# Patient Record
Sex: FEMALE | Race: WHITE | NOT HISPANIC OR LATINO | Employment: OTHER
[De-identification: names, ages, dates, MRNs, and addresses within clinical notes are randomized per-mention and may not be internally consistent; named-entity substitution may affect disease eponyms.]

---

## 2018-04-13 LAB
ABO + RH BLD: NORMAL
ABO + RH BLD: NORMAL
BLD GP AB SCN SERPL QL: NORMAL
HBV SURFACE AG SERPL QL IA: NORMAL
HIV 1+2 AB+HIV1 P24 AG SERPL QL IA: NORMAL
RUBELLA ANTIBODY IGG QUANTITATIVE: NORMAL IU/ML
TREPONEMA ANTIBODIES: NORMAL

## 2018-11-08 LAB — GROUP B STREP PCR: NORMAL

## 2018-11-25 ENCOUNTER — HEALTH MAINTENANCE LETTER (OUTPATIENT)
Age: 33
End: 2018-11-25

## 2018-12-05 ENCOUNTER — HOSPITAL ENCOUNTER (INPATIENT)
Facility: CLINIC | Age: 33
LOS: 4 days | Discharge: HOME HEALTH | End: 2018-12-09
Attending: OBSTETRICS & GYNECOLOGY | Admitting: OBSTETRICS & GYNECOLOGY
Payer: COMMERCIAL

## 2018-12-05 ENCOUNTER — HOSPITAL ENCOUNTER (OUTPATIENT)
Facility: CLINIC | Age: 33
Discharge: HOME | End: 2018-12-05
Attending: OBSTETRICS & GYNECOLOGY | Admitting: OBSTETRICS & GYNECOLOGY
Payer: COMMERCIAL

## 2018-12-05 VITALS
DIASTOLIC BLOOD PRESSURE: 84 MMHG | BODY MASS INDEX: 35.26 KG/M2 | SYSTOLIC BLOOD PRESSURE: 127 MMHG | TEMPERATURE: 99.1 F | HEIGHT: 63 IN | WEIGHT: 199 LBS

## 2018-12-05 LAB
ALT SERPL W P-5'-P-CCNC: 29 U/L (ref 0–50)
ANION GAP SERPL CALCULATED.3IONS-SCNC: 8 MMOL/L (ref 3–14)
AST SERPL W P-5'-P-CCNC: 26 U/L (ref 0–45)
BUN SERPL-MCNC: 14 MG/DL (ref 7–30)
CALCIUM SERPL-MCNC: 8.7 MG/DL (ref 8.5–10.1)
CHLORIDE SERPL-SCNC: 108 MMOL/L (ref 94–109)
CO2 SERPL-SCNC: 21 MMOL/L (ref 20–32)
CREAT SERPL-MCNC: 0.73 MG/DL (ref 0.52–1.04)
CREAT UR-MCNC: 26 MG/DL
ERYTHROCYTE [DISTWIDTH] IN BLOOD BY AUTOMATED COUNT: 12.6 % (ref 10–15)
GFR SERPL CREATININE-BSD FRML MDRD: >90 ML/MIN/1.7M2
GLUCOSE SERPL-MCNC: 71 MG/DL (ref 70–99)
HCT VFR BLD AUTO: 39.9 % (ref 35–47)
HGB BLD-MCNC: 14.5 G/DL (ref 11.7–15.7)
MCH RBC QN AUTO: 34.2 PG (ref 26.5–33)
MCHC RBC AUTO-ENTMCNC: 36.3 G/DL (ref 31.5–36.5)
MCV RBC AUTO: 94 FL (ref 78–100)
PLATELET # BLD AUTO: 197 10E9/L (ref 150–450)
POTASSIUM SERPL-SCNC: 4 MMOL/L (ref 3.4–5.3)
PROT UR-MCNC: <0.05 G/L
PROT/CREAT 24H UR: NORMAL G/G CR (ref 0–0.2)
RBC # BLD AUTO: 4.24 10E12/L (ref 3.8–5.2)
SODIUM SERPL-SCNC: 137 MMOL/L (ref 133–144)
URATE SERPL-MCNC: 5.2 MG/DL (ref 2.6–6)
WBC # BLD AUTO: 7.8 10E9/L (ref 4–11)

## 2018-12-05 PROCEDURE — 3E0P7VZ INTRODUCTION OF HORMONE INTO FEMALE REPRODUCTIVE, VIA NATURAL OR ARTIFICIAL OPENING: ICD-10-PCS | Performed by: OBSTETRICS & GYNECOLOGY

## 2018-12-05 PROCEDURE — 84550 ASSAY OF BLOOD/URIC ACID: CPT | Performed by: OBSTETRICS & GYNECOLOGY

## 2018-12-05 PROCEDURE — 84450 TRANSFERASE (AST) (SGOT): CPT | Performed by: OBSTETRICS & GYNECOLOGY

## 2018-12-05 PROCEDURE — 85027 COMPLETE CBC AUTOMATED: CPT | Performed by: OBSTETRICS & GYNECOLOGY

## 2018-12-05 PROCEDURE — 80048 BASIC METABOLIC PNL TOTAL CA: CPT | Performed by: OBSTETRICS & GYNECOLOGY

## 2018-12-05 PROCEDURE — 12000029 ZZH R&B OB INTERMEDIATE

## 2018-12-05 PROCEDURE — 59025 FETAL NON-STRESS TEST: CPT

## 2018-12-05 PROCEDURE — 25000132 ZZH RX MED GY IP 250 OP 250 PS 637: Performed by: OBSTETRICS & GYNECOLOGY

## 2018-12-05 PROCEDURE — G0463 HOSPITAL OUTPT CLINIC VISIT: HCPCS | Mod: 25

## 2018-12-05 PROCEDURE — 84460 ALANINE AMINO (ALT) (SGPT): CPT | Performed by: OBSTETRICS & GYNECOLOGY

## 2018-12-05 PROCEDURE — 84156 ASSAY OF PROTEIN URINE: CPT | Performed by: OBSTETRICS & GYNECOLOGY

## 2018-12-05 PROCEDURE — 36415 COLL VENOUS BLD VENIPUNCTURE: CPT | Performed by: OBSTETRICS & GYNECOLOGY

## 2018-12-05 RX ORDER — ONDANSETRON 2 MG/ML
4 INJECTION INTRAMUSCULAR; INTRAVENOUS EVERY 6 HOURS PRN
Status: DISCONTINUED | OUTPATIENT
Start: 2018-12-05 | End: 2018-12-07

## 2018-12-05 RX ORDER — OXYTOCIN/0.9 % SODIUM CHLORIDE 30/500 ML
100-340 PLASTIC BAG, INJECTION (ML) INTRAVENOUS CONTINUOUS PRN
Status: DISCONTINUED | OUTPATIENT
Start: 2018-12-05 | End: 2018-12-07

## 2018-12-05 RX ORDER — SODIUM CHLORIDE, SODIUM LACTATE, POTASSIUM CHLORIDE, CALCIUM CHLORIDE 600; 310; 30; 20 MG/100ML; MG/100ML; MG/100ML; MG/100ML
INJECTION, SOLUTION INTRAVENOUS CONTINUOUS
Status: DISCONTINUED | OUTPATIENT
Start: 2018-12-06 | End: 2018-12-07

## 2018-12-05 RX ORDER — METHYLERGONOVINE MALEATE 0.2 MG/ML
200 INJECTION INTRAVENOUS
Status: DISCONTINUED | OUTPATIENT
Start: 2018-12-05 | End: 2018-12-07

## 2018-12-05 RX ORDER — OXYTOCIN 10 [USP'U]/ML
10 INJECTION, SOLUTION INTRAMUSCULAR; INTRAVENOUS
Status: DISCONTINUED | OUTPATIENT
Start: 2018-12-05 | End: 2018-12-07

## 2018-12-05 RX ORDER — OXYCODONE AND ACETAMINOPHEN 5; 325 MG/1; MG/1
1 TABLET ORAL
Status: DISCONTINUED | OUTPATIENT
Start: 2018-12-05 | End: 2018-12-07

## 2018-12-05 RX ORDER — ONDANSETRON 2 MG/ML
4 INJECTION INTRAMUSCULAR; INTRAVENOUS EVERY 6 HOURS PRN
Status: DISCONTINUED | OUTPATIENT
Start: 2018-12-05 | End: 2018-12-05 | Stop reason: HOSPADM

## 2018-12-05 RX ORDER — ACETAMINOPHEN 325 MG/1
650 TABLET ORAL EVERY 4 HOURS PRN
Status: DISCONTINUED | OUTPATIENT
Start: 2018-12-05 | End: 2018-12-07

## 2018-12-05 RX ORDER — LIDOCAINE 40 MG/G
CREAM TOPICAL
Status: DISCONTINUED | OUTPATIENT
Start: 2018-12-05 | End: 2018-12-07

## 2018-12-05 RX ORDER — FENTANYL CITRATE 50 UG/ML
50-100 INJECTION, SOLUTION INTRAMUSCULAR; INTRAVENOUS
Status: DISCONTINUED | OUTPATIENT
Start: 2018-12-05 | End: 2018-12-07

## 2018-12-05 RX ORDER — HYDROXYZINE HYDROCHLORIDE 50 MG/1
50 TABLET, FILM COATED ORAL EVERY 6 HOURS PRN
Status: COMPLETED | OUTPATIENT
Start: 2018-12-05 | End: 2018-12-05

## 2018-12-05 RX ORDER — HYDROXYZINE HYDROCHLORIDE 50 MG/1
50-100 TABLET, FILM COATED ORAL
Status: COMPLETED | OUTPATIENT
Start: 2018-12-05 | End: 2018-12-05

## 2018-12-05 RX ORDER — CARBOPROST TROMETHAMINE 250 UG/ML
250 INJECTION, SOLUTION INTRAMUSCULAR
Status: DISCONTINUED | OUTPATIENT
Start: 2018-12-05 | End: 2018-12-07

## 2018-12-05 RX ORDER — NALOXONE HYDROCHLORIDE 0.4 MG/ML
.1-.4 INJECTION, SOLUTION INTRAMUSCULAR; INTRAVENOUS; SUBCUTANEOUS
Status: DISCONTINUED | OUTPATIENT
Start: 2018-12-05 | End: 2018-12-07

## 2018-12-05 RX ORDER — IBUPROFEN 400 MG/1
800 TABLET, FILM COATED ORAL
Status: DISCONTINUED | OUTPATIENT
Start: 2018-12-05 | End: 2018-12-07

## 2018-12-05 RX ADMIN — DINOPROSTONE 10 MG: 10 INSERT VAGINAL at 20:40

## 2018-12-05 RX ADMIN — HYDROXYZINE HYDROCHLORIDE 50 MG: 50 TABLET ORAL at 21:16

## 2018-12-05 NOTE — IP AVS SNAPSHOT
[address], [address]    [facility] [address]    Phone:  [phone]                                       After Visit Summary   12/5/2018    Anabel Araiza    MRN: [id number]           After Visit Summary Signature Page     I have received my discharge instructions, and my questions have been answered. I have discussed any challenges I see with this plan with the nurse or doctor.    ..........................................................................................................................................  Patient/Patient Representative Signature      ..........................................................................................................................................  Patient Representative Print Name and Relationship to Patient    ..................................................               ................................................  Date                                   Time    ..........................................................................................................................................  Reviewed by Signature/Title    ...................................................              ..............................................  Date                                               Time          22EPIC Rev 08/18

## 2018-12-05 NOTE — IP AVS SNAPSHOT
MRN:[id number]                      After Visit Summary   12/5/2018    Anabel Araiza    MRN: [id number]           Thank you!     Thank you for choosing [facility] for your care. Our goal is always to provide you with excellent care. Hearing back from our patients is one way we can continue to improve our services. Please take a few minutes to complete the written survey that you may receive in the mail after you visit with us. Thank you!        Patient Information     Date Of Birth          1985        About your hospital stay     You were admitted on:  December 5, 2018 You last received care in the:  [facility]    You were discharged on:  December 5, 2018       Who to Call     For medical emergencies, please call 911.  For non-urgent questions about your medical care, please call your primary care provider or clinic, [phone]          Attending Provider     Provider Specialty    Emy Ramirez MD OB/Gyn       Primary Care Provider Office Phone # Fax #    Emy Ramirez [phone]      Further instructions from your care team       Discharge Instruction for Undelivered Patients      You were seen for: hypertensive disorders in pregnancy  We Consulted: Dr. Ramirez  You had (Test or Medicine):non stress test, lab draws, serial blood pressures, urine testing     Diet:   Drink 8 to 12 glasses of liquids (milk, juice, water) every day.  You may eat meals and snacks.     Activity:  Call your doctor or nurse midwife if your baby is moving less than usual.     Call your provider if you notice:  Swelling in your face or increased swelling in your hands or legs.  Headaches that are not relieved by Tylenol (acetaminophen).  Changes in your vision (blurring: seeing spots or stars.)  Nausea (sick to your stomach) and vomiting (throwing up).   Weight gain of 5 pounds or more per week.  Heartburn that doesn't go away.  Signs of bladder infection: pain when you urinate (use the  "toilet), need to go more often and more urgently.  The bag of gutierrez (rupture of membranes) breaks, or you notice leaking in your underwear.  Bright red blood in your underwear.  Abdominal (lower belly) or stomach pain.  For first baby: Contractions (tightening) less than 5 minutes apart for one hour or more.  Increase or change in vaginal discharge (note the color and amount)      Follow-up:  This evening at 7:30pm for induction by cervical ripening          Pending Results     No orders found from 12/3/2018 to 2018.            Admission Information     Date & Time Provider Department Dept. Phone    2018 Emy Ramirez MD [facility] [phone]      Your Vitals Were     Blood Pressure Temperature Height Weight BMI (Body Mass Index)       127/84 99.1  F (37.3  C) 1.6 m (5' 3\") 90.3 kg (199 lb) 35.25 kg/m2       MyChart Information     PO-MO lets you send messages to your doctor, view your test results, renew your prescriptions, schedule appointments and more. To sign up, go to [email or web address] . Click on \"Log in\" on the left side of the screen, which will take you to the Welcome page. Then click on \"Sign up Now\" on the right side of the page.     You will be asked to enter the access code listed below, as well as some personal information. Please follow the directions to create your username and password.     Your access code is: [email or web address]  Expires: 3/5/2019  2:33 PM     Your access code will  in 90 days. If you need help or a new code, please call your [place] clinic or [phone].        Care EveryWhere ID     This is your Care EveryWhere ID. This could be used by other organizations to access your [place] medical records  [id number]        Equal Access to Services     [facility] : Sherlyn Berry, thiago mitchell, letty kachris walsh, anne sandoval. So [facility] [phone].    ATENCIÓN: Si franklin baxter " disposición servicios gratuitos de asistencia lingüística. Mary argueta [phone].    We comply with applicable federal civil rights laws and [place] laws. We do not discriminate on the basis of race, color, national origin, age, disability, sex, sexual orientation, or gender identity.               Review of your medicines      UNREVIEWED medicines. Ask your doctor about these medicines        Dose / Directions    FISH OIL + D3 PO        Refills:  0       MAGNESIUM OXIDE PO        Dose:  1000 mg   Take 1,000 mg by mouth   Refills:  0       PRENATAL 1 PO        Refills:  0       VITAMIN D (CHOLECALCIFEROL) PO        Dose:  500 Units   Take 500 Units by mouth daily   Refills:  0         CONTINUE these medicines which have NOT CHANGED        Dose / Directions    NO ACTIVE MEDICATIONS        Refills:  0                Protect others around you: Learn how to safely use, store and throw away your medicines at www.disposemymeds.org.             Medication List: This is a list of all your medications and when to take them. Check marks below indicate your daily home schedule. Keep this list as a reference.      Medications           Morning Afternoon Evening Bedtime As Needed    FISH OIL + D3 PO                                MAGNESIUM OXIDE PO   Take 1,000 mg by mouth                                NO ACTIVE MEDICATIONS                                PRENATAL 1 PO                                VITAMIN D (CHOLECALCIFEROL) PO   Take 500 Units by mouth daily

## 2018-12-05 NOTE — DISCHARGE INSTRUCTIONS
Discharge Instruction for Undelivered Patients      You were seen for: hypertensive disorders in pregnancy  We Consulted: Dr. Ramirez  You had (Test or Medicine):non stress test, lab draws, serial blood pressures, urine testing     Diet:   Drink 8 to 12 glasses of liquids (milk, juice, water) every day.  You may eat meals and snacks.     Activity:  Call your doctor or nurse midwife if your baby is moving less than usual.     Call your provider if you notice:  Swelling in your face or increased swelling in your hands or legs.  Headaches that are not relieved by Tylenol (acetaminophen).  Changes in your vision (blurring: seeing spots or stars.)  Nausea (sick to your stomach) and vomiting (throwing up).   Weight gain of 5 pounds or more per week.  Heartburn that doesn't go away.  Signs of bladder infection: pain when you urinate (use the toilet), need to go more often and more urgently.  The bag of gutierrez (rupture of membranes) breaks, or you notice leaking in your underwear.  Bright red blood in your underwear.  Abdominal (lower belly) or stomach pain.  For first baby: Contractions (tightening) less than 5 minutes apart for one hour or more.  Increase or change in vaginal discharge (note the color and amount)      Follow-up:  This evening at 7:30pm for induction by cervical ripening

## 2018-12-05 NOTE — PROVIDER NOTIFICATION
12/05/18 1418   Comments   Comments orders to d/c home and return this evening for induction

## 2018-12-05 NOTE — PLAN OF CARE
Data: [facility] at 1247.   Reason for maternal/fetal assessment per patient is Rule Out Pre-eclampsia  . Patient is a . Prenatal record reviewed.      Obstetric History       T0      L0     SAB0   TAB0   Ectopic0   Multiple0   Live Births0       # Outcome Date GA Lbr Fernando/2nd Weight Sex Delivery Anes PTL Lv   1 Current                  Medical History:   Past Medical History:   Diagnosis Date     Thyroid disease     hypo   . Gestational Age 40w2d. VSS. Cervix: not examined.  Fetal movement present. Patient denies cramping, backache, vaginal discharge, pelvic pressure, UTI symptoms, GI problems, bloody show, vaginal bleeding, edema, headache, visual disturbances, epigastric or URQ pain, abdominal pain, rupture of membranes. Support persons  present.  Pt was in clinic today with Dr. Ramirez and had first high BP of 150/100.  Needs further assessment in MAC.  Action: Verbal consent for EFM. Triage assessment completed. EFM applied for fetal well being with hypertension. Uterine assessment infrequent mild contractions pt denies feeling. Fetal assessment: Presumed adequate fetal oxygenation documented (see flow record). Preeclampsia labs and urine collected and sent to lab as ordered.  Serial BP's started.  Dr. Ramirez informed of all lab results and recent bp's.  Patient education pamphlets given on triage discharge instructions. Patient instructed to report change in fetal movement, vaginal leaking of fluid or bleeding, abdominal pain, or any concerns related to the pregnancy to her nurse/physician.   Response: Dr. Ramirez is discharging pt home to collect belongings with instructions to return this evening for plan of inducing with cervadil.  Patient verbalized understanding of education and verbalized agreement with plan. Discharged ambulatory at 1430.

## 2018-12-06 LAB
ABO + RH BLD: NORMAL
ABO + RH BLD: NORMAL
ALT SERPL W P-5'-P-CCNC: 25 U/L (ref 0–50)
AST SERPL W P-5'-P-CCNC: 21 U/L (ref 0–45)
CREAT SERPL-MCNC: 0.75 MG/DL (ref 0.52–1.04)
ERYTHROCYTE [DISTWIDTH] IN BLOOD BY AUTOMATED COUNT: 12.8 % (ref 10–15)
GFR SERPL CREATININE-BSD FRML MDRD: 89 ML/MIN/1.7M2
HCT VFR BLD AUTO: 39 % (ref 35–47)
HGB BLD-MCNC: 14 G/DL (ref 11.7–15.7)
MCH RBC QN AUTO: 34.1 PG (ref 26.5–33)
MCHC RBC AUTO-ENTMCNC: 35.9 G/DL (ref 31.5–36.5)
MCV RBC AUTO: 95 FL (ref 78–100)
PLATELET # BLD AUTO: 180 10E9/L (ref 150–450)
RBC # BLD AUTO: 4.1 10E12/L (ref 3.8–5.2)
SPECIMEN EXP DATE BLD: NORMAL
WBC # BLD AUTO: 7.5 10E9/L (ref 4–11)

## 2018-12-06 PROCEDURE — 86900 BLOOD TYPING SEROLOGIC ABO: CPT | Performed by: OBSTETRICS & GYNECOLOGY

## 2018-12-06 PROCEDURE — 84460 ALANINE AMINO (ALT) (SGPT): CPT | Performed by: OBSTETRICS & GYNECOLOGY

## 2018-12-06 PROCEDURE — 36415 COLL VENOUS BLD VENIPUNCTURE: CPT | Performed by: OBSTETRICS & GYNECOLOGY

## 2018-12-06 PROCEDURE — 12000031 ZZH R&B OB CRITICAL

## 2018-12-06 PROCEDURE — 25000125 ZZHC RX 250: Performed by: OBSTETRICS & GYNECOLOGY

## 2018-12-06 PROCEDURE — 86901 BLOOD TYPING SEROLOGIC RH(D): CPT | Performed by: OBSTETRICS & GYNECOLOGY

## 2018-12-06 PROCEDURE — 85027 COMPLETE CBC AUTOMATED: CPT | Performed by: OBSTETRICS & GYNECOLOGY

## 2018-12-06 PROCEDURE — 84450 TRANSFERASE (AST) (SGOT): CPT | Performed by: OBSTETRICS & GYNECOLOGY

## 2018-12-06 PROCEDURE — 10907ZC DRAINAGE OF AMNIOTIC FLUID, THERAPEUTIC FROM PRODUCTS OF CONCEPTION, VIA NATURAL OR ARTIFICIAL OPENING: ICD-10-PCS | Performed by: OBSTETRICS & GYNECOLOGY

## 2018-12-06 PROCEDURE — 25000132 ZZH RX MED GY IP 250 OP 250 PS 637: Performed by: OBSTETRICS & GYNECOLOGY

## 2018-12-06 PROCEDURE — 82565 ASSAY OF CREATININE: CPT | Performed by: OBSTETRICS & GYNECOLOGY

## 2018-12-06 PROCEDURE — 25000128 H RX IP 250 OP 636: Performed by: OBSTETRICS & GYNECOLOGY

## 2018-12-06 PROCEDURE — 86780 TREPONEMA PALLIDUM: CPT | Performed by: OBSTETRICS & GYNECOLOGY

## 2018-12-06 RX ORDER — LIDOCAINE 40 MG/G
CREAM TOPICAL
Status: DISCONTINUED | OUTPATIENT
Start: 2018-12-06 | End: 2018-12-07

## 2018-12-06 RX ORDER — OXYTOCIN/0.9 % SODIUM CHLORIDE 30/500 ML
1-24 PLASTIC BAG, INJECTION (ML) INTRAVENOUS CONTINUOUS
Status: DISCONTINUED | OUTPATIENT
Start: 2018-12-06 | End: 2018-12-07

## 2018-12-06 RX ORDER — MISOPROSTOL 100 UG/1
50 TABLET ORAL
Status: DISCONTINUED | OUTPATIENT
Start: 2018-12-06 | End: 2018-12-07

## 2018-12-06 RX ADMIN — Medication 50 MCG: at 09:53

## 2018-12-06 RX ADMIN — SODIUM CHLORIDE, POTASSIUM CHLORIDE, SODIUM LACTATE AND CALCIUM CHLORIDE: 600; 310; 30; 20 INJECTION, SOLUTION INTRAVENOUS at 21:04

## 2018-12-06 RX ADMIN — OXYTOCIN-SODIUM CHLORIDE 0.9% IV SOLN 30 UNIT/500ML 2 MILLI-UNITS/MIN: 30-0.9/5 SOLUTION at 21:05

## 2018-12-06 RX ADMIN — Medication 50 MCG: at 14:03

## 2018-12-06 RX ADMIN — SODIUM CHLORIDE, POTASSIUM CHLORIDE, SODIUM LACTATE AND CALCIUM CHLORIDE 500 ML: 600; 310; 30; 20 INJECTION, SOLUTION INTRAVENOUS at 23:03

## 2018-12-06 NOTE — H&P
No significant change in general health status based on examination of the patient, review of Nursing Admission Database and prenatal record.    G1 presented yesterday at 40.3 weeks for OB visits and found to have BP of 150/100 and mild HA.  Serial BP's improved at triage and preeclampsia labs normal.  Due to postdates and elevated BP, decision made to proceed with MIL.  Received cervidil last night and is currently on oral cytotec (1st 50 mcg dose at 0900).  FHT category 1.  Ctx every 1-3 min mild.  Cx was FT dilated at 0830 today, will recheck around 1700 today when 3rd dose of cytotec would be due.    Emy Ramirez

## 2018-12-06 NOTE — PROGRESS NOTES
"0715: This RN assumes care of patient from Sandra ARELLANO RN. Bedside report received, patient is comfortable and states she has no needs at this time.     0800: This RN in room. Patient and  questioning, \"what's next\" if the cervidil doesn't work. Education/information provided on possible other ways to ripen cervix and possible plans MD may do. Cervidil to come out at 0830 and this RN will update MD and come up with plans to discuss with patient and her . Patient and  acknowledge understanding and have no further questions. Patient to order breakfast.     0835: This RN in room to remove cervidil. See flowsheets for SVE.    0845: Dr. Emy Ramirez calls back for update. Plan is to give 50mcg of oral cytotec every 4 hours per protocol/orders. Dr. Ramirez to come visit with patient around the noon hour. Information given to patient and her  after the phone call. They are okay with the plan.     0930: Patient back to bed after a walk and shower. Monitors replaced on abdomen at 0936, Oral cytotec given. No questions.     0953: First dose of Cytotec given. Patient doing well. Has no needs at this time.     1200: Doing well, no questions.    1305: This RN in room. Patient standing stating cramping a bit more. Overall feeling well and not requesting needs at this time. Next dose of Cytotec due before 1400. Pt's  going to grab lunch.     1403: 2nd dose of Cytotec given. Patient comfortable, still feeling mild cramping. Eating lunch. No questions at this time.     1500: Patient and  sleeping    3474-8779: FHT appear to be consistent with late decelerations. Dr. Ramirez to come to unit soon and will update then. Return to baseline with no further concerns.     1600: Patient sleeping,  states no needs at this time.     1630: Dr. Ramirez on unit. Discussed strip to MD RE: possible fetal HR decels earlier. OK to proceed. TO patient and  about plan of care. SVE shows change and Dr. Ramirez " performs AROM at approximately 1640 with patient's verbal consent. OK for intermittent auscultation if no ripening agents or pitocin are being used. Plan to start pitocin after 1800 if contractions space out or no cervical change. Patient and  excited for new plan and state no further questions.     1800: Patient doing well, no questions.     1915: Nigel MARQUEZ RN given bedside report.

## 2018-12-07 ENCOUNTER — ANESTHESIA EVENT (OUTPATIENT)
Dept: OBGYN | Facility: CLINIC | Age: 33
End: 2018-12-07
Payer: COMMERCIAL

## 2018-12-07 ENCOUNTER — ANESTHESIA (OUTPATIENT)
Dept: OBGYN | Facility: CLINIC | Age: 33
End: 2018-12-07
Payer: COMMERCIAL

## 2018-12-07 LAB
ALT SERPL W P-5'-P-CCNC: 28 U/L (ref 0–50)
AST SERPL W P-5'-P-CCNC: 28 U/L (ref 0–45)
BLOOD BANK CMNT PATIENT-IMP: NORMAL
BLOOD BANK CMNT PATIENT-IMP: NORMAL
CREAT SERPL-MCNC: 0.7 MG/DL (ref 0.52–1.04)
ERYTHROCYTE [DISTWIDTH] IN BLOOD BY AUTOMATED COUNT: 12.7 % (ref 10–15)
GFR SERPL CREATININE-BSD FRML MDRD: >90 ML/MIN/1.7M2
HCT VFR BLD AUTO: 40.3 % (ref 35–47)
HGB BLD-MCNC: 14.6 G/DL (ref 11.7–15.7)
MCH RBC QN AUTO: 34.2 PG (ref 26.5–33)
MCHC RBC AUTO-ENTMCNC: 36.2 G/DL (ref 31.5–36.5)
MCV RBC AUTO: 94 FL (ref 78–100)
PLATELET # BLD AUTO: 183 10E9/L (ref 150–450)
RBC # BLD AUTO: 4.27 10E12/L (ref 3.8–5.2)
T PALLIDUM AB SER QL: NONREACTIVE
WBC # BLD AUTO: 15.3 10E9/L (ref 4–11)

## 2018-12-07 PROCEDURE — 25000128 H RX IP 250 OP 636: Performed by: OBSTETRICS & GYNECOLOGY

## 2018-12-07 PROCEDURE — 25000125 ZZHC RX 250: Performed by: NURSE ANESTHETIST, CERTIFIED REGISTERED

## 2018-12-07 PROCEDURE — 27210794 ZZH OR GENERAL SUPPLY STERILE: Performed by: OBSTETRICS & GYNECOLOGY

## 2018-12-07 PROCEDURE — 00HU33Z INSERTION OF INFUSION DEVICE INTO SPINAL CANAL, PERCUTANEOUS APPROACH: ICD-10-PCS | Performed by: SURGERY

## 2018-12-07 PROCEDURE — 37000009 ZZH ANESTHESIA TECHNICAL FEE, EACH ADDTL 15 MIN: Performed by: OBSTETRICS & GYNECOLOGY

## 2018-12-07 PROCEDURE — 36000058 ZZH SURGERY LEVEL 3 EA 15 ADDTL MIN: Performed by: OBSTETRICS & GYNECOLOGY

## 2018-12-07 PROCEDURE — 12000037 ZZH R&B POSTPARTUM INTERMEDIATE

## 2018-12-07 PROCEDURE — 25000125 ZZHC RX 250: Performed by: OBSTETRICS & GYNECOLOGY

## 2018-12-07 PROCEDURE — 27110038 ZZH RX 271: Performed by: SURGERY

## 2018-12-07 PROCEDURE — 84450 TRANSFERASE (AST) (SGOT): CPT | Performed by: OBSTETRICS & GYNECOLOGY

## 2018-12-07 PROCEDURE — 85027 COMPLETE CBC AUTOMATED: CPT | Performed by: OBSTETRICS & GYNECOLOGY

## 2018-12-07 PROCEDURE — 36000056 ZZH SURGERY LEVEL 3 1ST 30 MIN: Performed by: OBSTETRICS & GYNECOLOGY

## 2018-12-07 PROCEDURE — 25000128 H RX IP 250 OP 636

## 2018-12-07 PROCEDURE — 82565 ASSAY OF CREATININE: CPT | Performed by: OBSTETRICS & GYNECOLOGY

## 2018-12-07 PROCEDURE — 25000132 ZZH RX MED GY IP 250 OP 250 PS 637: Performed by: OBSTETRICS & GYNECOLOGY

## 2018-12-07 PROCEDURE — 3E0R3BZ INTRODUCTION OF ANESTHETIC AGENT INTO SPINAL CANAL, PERCUTANEOUS APPROACH: ICD-10-PCS | Performed by: SURGERY

## 2018-12-07 PROCEDURE — 84460 ALANINE AMINO (ALT) (SGPT): CPT | Performed by: OBSTETRICS & GYNECOLOGY

## 2018-12-07 PROCEDURE — 71000014 ZZH RECOVERY PHASE 1 LEVEL 2 FIRST HR: Performed by: OBSTETRICS & GYNECOLOGY

## 2018-12-07 PROCEDURE — 25000128 H RX IP 250 OP 636: Performed by: SURGERY

## 2018-12-07 PROCEDURE — 36415 COLL VENOUS BLD VENIPUNCTURE: CPT | Performed by: OBSTETRICS & GYNECOLOGY

## 2018-12-07 PROCEDURE — 37000008 ZZH ANESTHESIA TECHNICAL FEE, 1ST 30 MIN: Performed by: OBSTETRICS & GYNECOLOGY

## 2018-12-07 PROCEDURE — 25000128 H RX IP 250 OP 636: Performed by: NURSE ANESTHETIST, CERTIFIED REGISTERED

## 2018-12-07 PROCEDURE — 25000125 ZZHC RX 250

## 2018-12-07 RX ORDER — FENTANYL CITRATE 50 UG/ML
INJECTION, SOLUTION INTRAMUSCULAR; INTRAVENOUS
Status: COMPLETED
Start: 2018-12-07 | End: 2018-12-07

## 2018-12-07 RX ORDER — ONDANSETRON 2 MG/ML
4 INJECTION INTRAMUSCULAR; INTRAVENOUS EVERY 6 HOURS PRN
Status: DISCONTINUED | OUTPATIENT
Start: 2018-12-07 | End: 2018-12-09 | Stop reason: HOSPADM

## 2018-12-07 RX ORDER — ACETAMINOPHEN 325 MG/1
975 TABLET ORAL EVERY 8 HOURS
Status: DISCONTINUED | OUTPATIENT
Start: 2018-12-07 | End: 2018-12-09 | Stop reason: HOSPADM

## 2018-12-07 RX ORDER — HYDROMORPHONE HYDROCHLORIDE 1 MG/ML
.3-.5 INJECTION, SOLUTION INTRAMUSCULAR; INTRAVENOUS; SUBCUTANEOUS EVERY 30 MIN PRN
Status: DISCONTINUED | OUTPATIENT
Start: 2018-12-07 | End: 2018-12-09 | Stop reason: HOSPADM

## 2018-12-07 RX ORDER — BISACODYL 10 MG
10 SUPPOSITORY, RECTAL RECTAL DAILY PRN
Status: DISCONTINUED | OUTPATIENT
Start: 2018-12-09 | End: 2018-12-09 | Stop reason: HOSPADM

## 2018-12-07 RX ORDER — MORPHINE SULFATE 1 MG/ML
INJECTION, SOLUTION EPIDURAL; INTRATHECAL; INTRAVENOUS PRN
Status: DISCONTINUED | OUTPATIENT
Start: 2018-12-07 | End: 2018-12-07

## 2018-12-07 RX ORDER — HYDROCORTISONE 2.5 %
CREAM (GRAM) TOPICAL 3 TIMES DAILY PRN
Status: DISCONTINUED | OUTPATIENT
Start: 2018-12-07 | End: 2018-12-09 | Stop reason: HOSPADM

## 2018-12-07 RX ORDER — SIMETHICONE 80 MG
80 TABLET,CHEWABLE ORAL 4 TIMES DAILY PRN
Status: DISCONTINUED | OUTPATIENT
Start: 2018-12-07 | End: 2018-12-09 | Stop reason: HOSPADM

## 2018-12-07 RX ORDER — LIDOCAINE 40 MG/G
CREAM TOPICAL
Status: DISCONTINUED | OUTPATIENT
Start: 2018-12-07 | End: 2018-12-07

## 2018-12-07 RX ORDER — KETOROLAC TROMETHAMINE 30 MG/ML
30 INJECTION, SOLUTION INTRAMUSCULAR; INTRAVENOUS EVERY 6 HOURS
Status: COMPLETED | OUTPATIENT
Start: 2018-12-07 | End: 2018-12-08

## 2018-12-07 RX ORDER — OXYTOCIN 10 [USP'U]/ML
10 INJECTION, SOLUTION INTRAMUSCULAR; INTRAVENOUS
Status: DISCONTINUED | OUTPATIENT
Start: 2018-12-07 | End: 2018-12-09 | Stop reason: HOSPADM

## 2018-12-07 RX ORDER — LIDOCAINE HCL/EPINEPHRINE/PF 2%-1:200K
VIAL (ML) INJECTION
Status: DISCONTINUED
Start: 2018-12-07 | End: 2018-12-07 | Stop reason: HOSPADM

## 2018-12-07 RX ORDER — ACETAMINOPHEN 325 MG/1
650 TABLET ORAL EVERY 4 HOURS PRN
Status: DISCONTINUED | OUTPATIENT
Start: 2018-12-10 | End: 2018-12-09 | Stop reason: HOSPADM

## 2018-12-07 RX ORDER — LANOLIN 100 %
OINTMENT (GRAM) TOPICAL
Status: DISCONTINUED | OUTPATIENT
Start: 2018-12-07 | End: 2018-12-09 | Stop reason: HOSPADM

## 2018-12-07 RX ORDER — EPHEDRINE SULFATE 50 MG/ML
5 INJECTION, SOLUTION INTRAMUSCULAR; INTRAVENOUS; SUBCUTANEOUS
Status: DISCONTINUED | OUTPATIENT
Start: 2018-12-07 | End: 2018-12-07

## 2018-12-07 RX ORDER — CITRIC ACID/SODIUM CITRATE 334-500MG
30 SOLUTION, ORAL ORAL
Status: COMPLETED | OUTPATIENT
Start: 2018-12-07 | End: 2018-12-07

## 2018-12-07 RX ORDER — AMOXICILLIN 250 MG
2 CAPSULE ORAL 2 TIMES DAILY PRN
Status: DISCONTINUED | OUTPATIENT
Start: 2018-12-07 | End: 2018-12-09 | Stop reason: HOSPADM

## 2018-12-07 RX ORDER — SODIUM CHLORIDE, SODIUM LACTATE, POTASSIUM CHLORIDE, CALCIUM CHLORIDE 600; 310; 30; 20 MG/100ML; MG/100ML; MG/100ML; MG/100ML
INJECTION, SOLUTION INTRAVENOUS CONTINUOUS
Status: DISCONTINUED | OUTPATIENT
Start: 2018-12-07 | End: 2018-12-07

## 2018-12-07 RX ORDER — MORPHINE SULFATE 1 MG/ML
INJECTION, SOLUTION EPIDURAL; INTRATHECAL; INTRAVENOUS
Status: COMPLETED
Start: 2018-12-07 | End: 2018-12-07

## 2018-12-07 RX ORDER — METHYLERGONOVINE MALEATE 0.2 MG/ML
200 INJECTION INTRAVENOUS
Status: DISCONTINUED | OUTPATIENT
Start: 2018-12-07 | End: 2018-12-09 | Stop reason: HOSPADM

## 2018-12-07 RX ORDER — CARBOPROST TROMETHAMINE 250 UG/ML
250 INJECTION, SOLUTION INTRAMUSCULAR
Status: DISCONTINUED | OUTPATIENT
Start: 2018-12-07 | End: 2018-12-09 | Stop reason: HOSPADM

## 2018-12-07 RX ORDER — ACETAMINOPHEN 325 MG/1
TABLET ORAL
Status: DISCONTINUED
Start: 2018-12-07 | End: 2018-12-07 | Stop reason: HOSPADM

## 2018-12-07 RX ORDER — ONDANSETRON 2 MG/ML
INJECTION INTRAMUSCULAR; INTRAVENOUS
Status: DISCONTINUED
Start: 2018-12-07 | End: 2018-12-07 | Stop reason: HOSPADM

## 2018-12-07 RX ORDER — DEXTROSE, SODIUM CHLORIDE, SODIUM LACTATE, POTASSIUM CHLORIDE, AND CALCIUM CHLORIDE 5; .6; .31; .03; .02 G/100ML; G/100ML; G/100ML; G/100ML; G/100ML
INJECTION, SOLUTION INTRAVENOUS CONTINUOUS
Status: DISCONTINUED | OUTPATIENT
Start: 2018-12-07 | End: 2018-12-09 | Stop reason: HOSPADM

## 2018-12-07 RX ORDER — NALBUPHINE HYDROCHLORIDE 10 MG/ML
2.5-5 INJECTION, SOLUTION INTRAMUSCULAR; INTRAVENOUS; SUBCUTANEOUS EVERY 6 HOURS PRN
Status: DISCONTINUED | OUTPATIENT
Start: 2018-12-07 | End: 2018-12-07

## 2018-12-07 RX ORDER — OXYTOCIN/0.9 % SODIUM CHLORIDE 30/500 ML
340 PLASTIC BAG, INJECTION (ML) INTRAVENOUS CONTINUOUS PRN
Status: DISCONTINUED | OUTPATIENT
Start: 2018-12-07 | End: 2018-12-09 | Stop reason: HOSPADM

## 2018-12-07 RX ORDER — OXYTOCIN/0.9 % SODIUM CHLORIDE 30/500 ML
PLASTIC BAG, INJECTION (ML) INTRAVENOUS
Status: DISCONTINUED
Start: 2018-12-07 | End: 2018-12-07 | Stop reason: HOSPADM

## 2018-12-07 RX ORDER — CEFAZOLIN SODIUM 2 G/100ML
2 INJECTION, SOLUTION INTRAVENOUS
Status: COMPLETED | OUTPATIENT
Start: 2018-12-07 | End: 2018-12-07

## 2018-12-07 RX ORDER — OXYTOCIN/0.9 % SODIUM CHLORIDE 30/500 ML
100 PLASTIC BAG, INJECTION (ML) INTRAVENOUS CONTINUOUS
Status: DISCONTINUED | OUTPATIENT
Start: 2018-12-07 | End: 2018-12-09 | Stop reason: HOSPADM

## 2018-12-07 RX ORDER — IBUPROFEN 400 MG/1
800 TABLET, FILM COATED ORAL EVERY 6 HOURS PRN
Status: DISCONTINUED | OUTPATIENT
Start: 2018-12-07 | End: 2018-12-09 | Stop reason: HOSPADM

## 2018-12-07 RX ORDER — LIDOCAINE 40 MG/G
CREAM TOPICAL
Status: DISCONTINUED | OUTPATIENT
Start: 2018-12-07 | End: 2018-12-09 | Stop reason: HOSPADM

## 2018-12-07 RX ORDER — NALOXONE HYDROCHLORIDE 0.4 MG/ML
.1-.4 INJECTION, SOLUTION INTRAMUSCULAR; INTRAVENOUS; SUBCUTANEOUS
Status: DISCONTINUED | OUTPATIENT
Start: 2018-12-07 | End: 2018-12-09 | Stop reason: HOSPADM

## 2018-12-07 RX ORDER — CEFAZOLIN SODIUM 1 G/3ML
1 INJECTION, POWDER, FOR SOLUTION INTRAMUSCULAR; INTRAVENOUS SEE ADMIN INSTRUCTIONS
Status: DISCONTINUED | OUTPATIENT
Start: 2018-12-07 | End: 2018-12-07

## 2018-12-07 RX ORDER — OXYTOCIN/0.9 % SODIUM CHLORIDE 30/500 ML
PLASTIC BAG, INJECTION (ML) INTRAVENOUS
Status: COMPLETED
Start: 2018-12-07 | End: 2018-12-07

## 2018-12-07 RX ORDER — KETOROLAC TROMETHAMINE 30 MG/ML
INJECTION, SOLUTION INTRAMUSCULAR; INTRAVENOUS
Status: COMPLETED
Start: 2018-12-07 | End: 2018-12-07

## 2018-12-07 RX ORDER — LIDOCAINE HCL/EPINEPHRINE/PF 2%-1:200K
VIAL (ML) INJECTION PRN
Status: DISCONTINUED | OUTPATIENT
Start: 2018-12-07 | End: 2018-12-07

## 2018-12-07 RX ORDER — AMOXICILLIN 250 MG
1 CAPSULE ORAL 2 TIMES DAILY PRN
Status: DISCONTINUED | OUTPATIENT
Start: 2018-12-07 | End: 2018-12-09 | Stop reason: HOSPADM

## 2018-12-07 RX ORDER — OXYCODONE HYDROCHLORIDE 5 MG/1
5-10 TABLET ORAL
Status: DISCONTINUED | OUTPATIENT
Start: 2018-12-07 | End: 2018-12-09 | Stop reason: HOSPADM

## 2018-12-07 RX ORDER — MISOPROSTOL 200 UG/1
800 TABLET ORAL
Status: DISCONTINUED | OUTPATIENT
Start: 2018-12-07 | End: 2018-12-09 | Stop reason: HOSPADM

## 2018-12-07 RX ORDER — ROPIVACAINE HYDROCHLORIDE 2 MG/ML
10 INJECTION, SOLUTION EPIDURAL; INFILTRATION; PERINEURAL ONCE
Status: COMPLETED | OUTPATIENT
Start: 2018-12-07 | End: 2018-12-07

## 2018-12-07 RX ORDER — NALOXONE HYDROCHLORIDE 0.4 MG/ML
.1-.4 INJECTION, SOLUTION INTRAMUSCULAR; INTRAVENOUS; SUBCUTANEOUS
Status: DISCONTINUED | OUTPATIENT
Start: 2018-12-07 | End: 2018-12-07

## 2018-12-07 RX ADMIN — SODIUM CHLORIDE, POTASSIUM CHLORIDE, SODIUM LACTATE AND CALCIUM CHLORIDE: 600; 310; 30; 20 INJECTION, SOLUTION INTRAVENOUS at 05:51

## 2018-12-07 RX ADMIN — Medication 12 ML/HR: at 04:30

## 2018-12-07 RX ADMIN — SODIUM CHLORIDE, SODIUM LACTATE, POTASSIUM CHLORIDE, CALCIUM CHLORIDE AND DEXTROSE MONOHYDRATE: 5; 600; 310; 30; 20 INJECTION, SOLUTION INTRAVENOUS at 21:34

## 2018-12-07 RX ADMIN — OXYTOCIN-SODIUM CHLORIDE 0.9% IV SOLN 30 UNIT/500ML 340 ML/HR: 30-0.9/5 SOLUTION at 12:28

## 2018-12-07 RX ADMIN — SODIUM CHLORIDE, POTASSIUM CHLORIDE, SODIUM LACTATE AND CALCIUM CHLORIDE: 600; 310; 30; 20 INJECTION, SOLUTION INTRAVENOUS at 10:59

## 2018-12-07 RX ADMIN — LIDOCAINE HYDROCHLORIDE,EPINEPHRINE BITARTRATE 5 ML: 20; .005 INJECTION, SOLUTION EPIDURAL; INFILTRATION; INTRACAUDAL; PERINEURAL at 11:58

## 2018-12-07 RX ADMIN — SODIUM CHLORIDE, POTASSIUM CHLORIDE, SODIUM LACTATE AND CALCIUM CHLORIDE 500 ML: 600; 310; 30; 20 INJECTION, SOLUTION INTRAVENOUS at 03:06

## 2018-12-07 RX ADMIN — KETOROLAC TROMETHAMINE 30 MG: 30 INJECTION, SOLUTION INTRAMUSCULAR at 13:40

## 2018-12-07 RX ADMIN — MORPHINE SULFATE 2 MG: 1 INJECTION, SOLUTION EPIDURAL; INTRATHECAL; INTRAVENOUS at 12:28

## 2018-12-07 RX ADMIN — ROPIVACAINE HYDROCHLORIDE 3 ML: 2 INJECTION, SOLUTION EPIDURAL; INFILTRATION at 04:28

## 2018-12-07 RX ADMIN — LIDOCAINE HYDROCHLORIDE,EPINEPHRINE BITARTRATE 5 ML: 20; .005 INJECTION, SOLUTION EPIDURAL; INFILTRATION; INTRACAUDAL; PERINEURAL at 12:06

## 2018-12-07 RX ADMIN — SODIUM CHLORIDE, POTASSIUM CHLORIDE, SODIUM LACTATE AND CALCIUM CHLORIDE: 600; 310; 30; 20 INJECTION, SOLUTION INTRAVENOUS at 12:37

## 2018-12-07 RX ADMIN — SENNOSIDES AND DOCUSATE SODIUM 1 TABLET: 8.6; 5 TABLET ORAL at 20:01

## 2018-12-07 RX ADMIN — CEFAZOLIN SODIUM 2 G: 2 INJECTION, SOLUTION INTRAVENOUS at 12:05

## 2018-12-07 RX ADMIN — AZITHROMYCIN MONOHYDRATE 500 MG: 500 INJECTION, POWDER, LYOPHILIZED, FOR SOLUTION INTRAVENOUS at 11:51

## 2018-12-07 RX ADMIN — AZITHROMYCIN MONOHYDRATE 500 G: 500 INJECTION, POWDER, LYOPHILIZED, FOR SOLUTION INTRAVENOUS at 12:04

## 2018-12-07 RX ADMIN — ROPIVACAINE HYDROCHLORIDE 3 ML: 2 INJECTION, SOLUTION EPIDURAL; INFILTRATION at 04:25

## 2018-12-07 RX ADMIN — SODIUM CHLORIDE, POTASSIUM CHLORIDE, SODIUM LACTATE AND CALCIUM CHLORIDE: 600; 310; 30; 20 INJECTION, SOLUTION INTRAVENOUS at 02:06

## 2018-12-07 RX ADMIN — KETOROLAC TROMETHAMINE 30 MG: 30 INJECTION, SOLUTION INTRAMUSCULAR at 20:01

## 2018-12-07 RX ADMIN — ONDANSETRON 4 MG: 2 INJECTION INTRAMUSCULAR; INTRAVENOUS at 12:05

## 2018-12-07 RX ADMIN — ACETAMINOPHEN 975 MG: 325 TABLET, FILM COATED ORAL at 14:42

## 2018-12-07 RX ADMIN — SODIUM CITRATE AND CITRIC ACID MONOHYDRATE 30 ML: 500; 334 SOLUTION ORAL at 11:45

## 2018-12-07 RX ADMIN — LIDOCAINE HYDROCHLORIDE,EPINEPHRINE BITARTRATE 5 ML: 20; .005 INJECTION, SOLUTION EPIDURAL; INFILTRATION; INTRACAUDAL; PERINEURAL at 12:18

## 2018-12-07 RX ADMIN — SODIUM CHLORIDE, POTASSIUM CHLORIDE, SODIUM LACTATE AND CALCIUM CHLORIDE 500 ML: 600; 310; 30; 20 INJECTION, SOLUTION INTRAVENOUS at 05:38

## 2018-12-07 RX ADMIN — Medication: at 16:30

## 2018-12-07 RX ADMIN — ROPIVACAINE HYDROCHLORIDE 4 ML: 2 INJECTION, SOLUTION EPIDURAL; INFILTRATION at 04:31

## 2018-12-07 RX ADMIN — FENTANYL CITRATE 50 MCG: 50 INJECTION, SOLUTION INTRAMUSCULAR; INTRAVENOUS at 13:01

## 2018-12-07 ASSESSMENT — ACTIVITIES OF DAILY LIVING (ADL)
FALL_HISTORY_WITHIN_LAST_SIX_MONTHS: NO
TRANSFERRING: 0-->INDEPENDENT
TOILETING: 0-->INDEPENDENT
RETIRED_COMMUNICATION: 0-->UNDERSTANDS/COMMUNICATES WITHOUT DIFFICULTY
SWALLOWING: 0-->SWALLOWS FOODS/LIQUIDS WITHOUT DIFFICULTY
RETIRED_EATING: 0-->INDEPENDENT
DRESS: 0-->INDEPENDENT
BATHING: 0-->INDEPENDENT
AMBULATION: 0-->INDEPENDENT
COGNITION: 0 - NO COGNITION ISSUES REPORTED

## 2018-12-07 ASSESSMENT — LIFESTYLE VARIABLES: TOBACCO_USE: 0

## 2018-12-07 ASSESSMENT — ENCOUNTER SYMPTOMS: SEIZURES: 0

## 2018-12-07 NOTE — ANESTHESIA PREPROCEDURE EVALUATION
Procedure: * No procedures listed *  Preop diagnosis: * No pre-op diagnosis entered *    Allergies   Allergen Reactions     Augmentin Hives     Past Medical History:   Diagnosis Date     Thyroid disease     hypo     Past Surgical History:   Procedure Laterality Date     APPENDECTOMY  1998     ORTHOPEDIC SURGERY  2001    acl surgery     wisdom teeth       Social History   Substance Use Topics     Smoking status: Never Smoker     Smokeless tobacco: Never Used     Alcohol use No     Prior to Admission medications    Medication Sig Start Date End Date Taking? Authorizing Provider   Fish Oil-Cholecalciferol (FISH OIL + D3 PO)    Yes Reported, Patient   MAGNESIUM OXIDE PO Take 1,000 mg by mouth   Yes Reported, Patient   Prenatal MV-Min-Fe Fum-FA-DHA (PRENATAL 1 PO)    Yes Reported, Patient   VITAMIN D, CHOLECALCIFEROL, PO Take 500 Units by mouth daily   Yes Reported, Patient   NO ACTIVE MEDICATIONS     Reported, Patient     Current Facility-Administered Medications Ordered in Epic   Medication Dose Route Frequency Last Rate Last Dose     acetaminophen (TYLENOL) tablet 650 mg  650 mg Oral Q4H PRN         carboprost (HEMABATE) injection 250 mcg  250 mcg Intramuscular Once PRN         dinoprostone(CERVIDIL) suppository REMOVAL   Vaginal Once         ePHEDrine injection 5 mg  5 mg Intravenous Q3 Min PRN         fentaNYL (PF) (SUBLIMAZE) injection  mcg   mcg Intravenous Q1H PRN         fentaNYL (SUBLIMAZE) 2 mcg/mL, ropivacaine (NAROPIN) 0.2% in NaCl 0.9% EPIDURAL infusion   EPIDURAL Continuous 12 mL/hr at 12/07/18 0430 12 mL/hr at 12/07/18 0430     ibuprofen (ADVIL/MOTRIN) tablet 800 mg  800 mg Oral Once PRN         lactated ringers BOLUS 250 mL  250 mL Intravenous Once PRN         lactated ringers infusion   Intravenous Continuous 125 mL/hr at 12/07/18 0206       lidocaine (LMX4) cream   Topical Q1H PRN         lidocaine (LMX4) cream   Topical Q1H PRN         lidocaine 1 % 0.1-20 mL  0.1-20 mL Subcutaneous Once  "PRN         lidocaine 1 % 1 mL  1 mL Other Q1H PRN         lidocaine 1 % 1 mL  1 mL Other Q1H PRN         medication instruction   Does not apply Continuous PRN         medication instruction   Does not apply Continuous PRN         Medication Instructions: misoprostol (CYTOTEC)- Nurse to discuss ordering with provider, if needed. Ordered via \"OB misoprostol (CYTOTEC) Postpartum Hemorrhage PANEL\"   Does not apply Continuous PRN         methylergonovine (METHERGINE) injection 200 mcg  200 mcg Intramuscular Once PRN         misoprostol (cervical ripening) (CYTOTEC) quarter-tab 50 mcg  50 mcg Oral Q4H DUTCH   50 mcg at 12/06/18 1403     nalbuphine (NUBAIN) injection 2.5-5 mg  2.5-5 mg Intravenous Q6H PRN         naloxone (NARCAN) injection 0.1-0.4 mg  0.1-0.4 mg Intravenous Q2 Min PRN         naloxone (NARCAN) injection 0.1-0.4 mg  0.1-0.4 mg Intravenous Q2 Min PRN         naloxone (NARCAN) injection 0.1-0.4 mg  0.1-0.4 mg Intravenous Q2 Min PRN         nitrous oxide/oxygen 50/50 blend   Inhalation Continuous PRN         ondansetron (ZOFRAN) injection 4 mg  4 mg Intravenous Q6H PRN         Opioid plan postpartum - medication instruction   Does not apply Continuous PRN         oxyCODONE-acetaminophen (PERCOCET) 5-325 MG per tablet 1 tablet  1 tablet Oral Once PRN         oxytocin (PITOCIN) 30 units in 500 mL 0.9% NaCl infusion  1-24 collin-units/min Intravenous Continuous   Stopped at 12/07/18 0307     oxytocin (PITOCIN) 30 units in 500 mL 0.9% NaCl infusion  100-340 mL/hr Intravenous Continuous PRN         oxytocin (PITOCIN) injection 10 Units  10 Units Intramuscular Once PRN         ROPivacaine (NAROPIN) injection 10 mL  10 mL EPIDURAL Once         sodium chloride (PF) 0.9% PF flush 3 mL  3 mL Intracatheter Q1H PRN         sodium chloride (PF) 0.9% PF flush 3 mL  3 mL Intracatheter Q8H         sodium chloride (PF) 0.9% PF flush 3 mL  3 mL Intracatheter Q1H PRN         sodium chloride (PF) 0.9% PF flush 3 mL  3 mL " Intracatheter Q8H   3 mL at 12/06/18 1903     tranexamic acid (CYKLOKAPRON) Bolus 1g vial attach to NaCl 50 or 100 mL bag ADULT  1 g Intravenous Q30 Min PRN         No current [facility]-ordered outpatient prescriptions on file.       fentaNYL-ropivacaine 12 mL/hr (12/07/18 0430)     lactated ringers 125 mL/hr at 12/07/18 0206     - MEDICATION INSTRUCTIONS -       - MEDICATION INSTRUCTIONS -       - MEDICATION INSTRUCTIONS -       nitrous oxide/oxygen 50/50 blend       - MEDICATION INSTRUCTIONS -       oxytocin in 0.9% NaCl Stopped (12/07/18 0307)     oxytocin in 0.9% NaCl       Wt Readings from Last 1 Encounters:   12/05/18 90.4 kg (199 lb 6.4 oz)     Temp Readings from Last 1 Encounters:   12/07/18 36.3  C (97.3  F) (Temporal)     BP Readings from Last 6 Encounters:   12/07/18 134/82   12/05/18 127/84   02/23/12 122/60     Pulse Readings from Last 4 Encounters:   12/07/18 66     Resp Readings from Last 1 Encounters:   12/06/18 16     SpO2 Readings from Last 1 Encounters:   No data found for SpO2     Recent Labs   Lab Test  12/07/18   0325  12/06/18   0830  12/05/18   1310   NA   --    --   137   POTASSIUM   --    --   4.0   CHLORIDE   --    --   108   CO2   --    --   21   ANIONGAP   --    --   8   GLC   --    --   71   BUN   --    --   14   CR  0.70  0.75  0.73   SPENSER   --    --   8.7     Recent Labs   Lab Test  12/07/18   0325  12/06/18   0830   AST  28  21   ALT  28  25     Recent Labs   Lab Test  12/07/18   0325  12/06/18   0830   WBC  15.3*  7.5   HGB  14.6  14.0   PLT  183  180     Recent Labs   Lab Test  12/06/18   0830   ABO  O   RH  Neg     No results for input(s): INR, PTT in the last 31773 hours.   No results for input(s): TROPI in the last 31648 hours.  No results for input(s): PH, PCO2, PO2, HCO3 in the last 69644 hours.  No results for input(s): HCG in the last 07691 hours.  No results found for this or any previous visit (from the past 744 hour(s)).    RECENT LABS:   ECG:   ECHO:       Anesthesia  Evaluation       history and physical reviewed .             ROS/MED HX    ENT/Pulmonary:  - neg pulmonary ROS     Neurologic:  - neg neurologic ROS     Cardiovascular:  - neg cardiovascular ROS       METS/Exercise Tolerance:     Hematologic:         Musculoskeletal:         GI/Hepatic:         Renal/Genitourinary:         Endo:         Psychiatric:         Infectious Disease:         Malignancy:         Other:                     Physical Exam  Normal systems: cardiovascular and pulmonary    Airway   Mallampati: II  TM distance: > 3 FB  Neck ROM: full  Mouth opening: > 3 cm    Dental     Cardiovascular       Pulmonary                                          .

## 2018-12-07 NOTE — PROGRESS NOTES
OB Progress Note    Patient now completely dilated despite needing to turn off pitocin for category 2 FHT this morning. She tried pushing one but had a 4 minute deceleration. Minimal variability, returned to baseline of 150 and no further decelerations now that pitocin has been turned off. Patient does not want to try pushing any more as she has been worried about her baby's intolerance to labor all morning. She desires a  section. I discussed the risks and benefits of a  section in detail with the patient and she accepts a blood transfusion as well as the risks of surgery. We will proceed with primary  section for category 2 FHT remote from delivery. She is Rh negative and will need Rhogam after delivery. Written consent was obtained and the OR is preparing for surgery. Azithromycin and Ancef ordered prior to incision. Anesthesia aware.     Nelly Jimenez MD  OB/GYN & Infertility  Pager [phone]  18

## 2018-12-07 NOTE — ANESTHESIA CARE TRANSFER NOTE
Patient: Anabel Araiza    Procedure(s):   SECTION    Diagnosis: pregnancy  Diagnosis Additional Information: No value filed.    Anesthesia Type:   Epidural     Note:  Airway :Room Air  Patient transferred to:PACU        Vitals: (Last set prior to Anesthesia Care Transfer)    CRNA VITALS  2018 1247 - 2018 1322      2018             EKG: Sinus tachycardia                Electronically Signed By: JANNETTE Bass CRNA  2018  1:22 PM

## 2018-12-07 NOTE — OP NOTE
OB/GYN  Section Operative Note     Surgery Date:  2018    Surgeon:  Beatriz Jimenez MD    Assistants:  OR staff    Pre-op Diagnosis:  1. Intrauterine pregnancy at 40w4d     2. Category 2 FHT remote from delivery     3. Rh negative status     4. GBS negative status     Post-op Diagnosis:  1. Same, s/p delivery     2. Viable female infant      3. Double nuchal cord    Procedure:  Primary low-transverse  section with two layer uterine closure via Pfannenstiel incision    Anesthesia: Epidural    EBL:  800 mL    Drains: Baird Catheter     Specimens:  Cord blood    Complications:  None     Indications:   Anabel Araiza is a 33 year old  at 40w4d admitted for IOL for gestational hypertension. She had normal LFTs, Cr and platelets and no proteinuria. Her BPs remained mild range and did not need to be treated with antihypertensives. Given the new diagnosis of gest HTN, cervical ripening was started with cervidil and followed by oral cytotec. She underwent AROM at 1640 on 18 and was started on pitocin. She progressed to 4 cm dilated and received an epidural for anesthesia. The fetal heart tracing showed some minimal variability and intermittent decelerations requiring the pitocin to be turned off and on again multiple times. She progressed to completely dilated and tried a practice push. The FHT was significant for a 4 minute prolonged deceleration with return to baseline of 150 with minimal variability. She was at +2 station but had significant caput of fetal head. The pitocin was turned off immediately. At that time the discussion was had with the patient whether to continue attempting to push vs proceeding with a  section given concern for fetal tracing.   The risks, benefits, and alternatives of  section were discussed with the patient and the patient desired to proceed with a primary  section. Written consent was obtained prior to the procedure.     Findings:    Clear amniotic fluid. Double nuchal cord noted. OA position with significant caput. Vigorous female infant in cephalic presentation with Apgars 8 and 9 at 1 and 5 minutes, respectively. NICU present for delivery. Arterial cord pH 7.23, base deficit 3. Venous cord pH 7.37, base deficit 0.9. Weight 6 lb 10 oz. Normal appearing bilateral fallopian tubes and ovaries. Small implants of endometriosis on fundus and posterior uterus.     Procedure Details:   The patient was brought to the OR, where adequate epidural anesthesia was administered.  She was placed in the dorsal supine position with a slight leftward tilt. A vaginal exam was performed and the fetal head was attempted to be pushed up given the low fetal station. She was prepped and draped in the usual sterile fashion. A surgical time out was performed. A pfannenstiel skin incision was made with the scalpel, and carried down to the underlying fascia with sharp and blunt dissection. The fascia was incised in the midline, and the incision was extended laterally with the Deras scissors. The superior aspect of the fascia was grasped with the Kocher clamps and dissected off of the underlying rectus muscles with blunt and sharp dissection. Attention was then turned to the inferior aspect of the fascia, which was similarly dissected off of the underlying rectus muscles. The rectus muscles were  in the midline, and the peritoneum was entered bluntly, and the opening was extended with digital pressure. The bladder blade was placed.  A transverse hysterotomy was made with the scalpel in the lower uterine segment, and the incision was extended with digital pressure. The infant was noted to be in the STEPHANIE position and was wedged low in the pelvis but the fetal head was slowly brought up to the hysterotomy, and the baby was delivered atraumatically. The shoulders delivered easily.  A double nuchal cord was noted. The cord was doubly clamped and cut after 60 seconds,  and the infant was handed off to the awaiting NICU staff. A segment of cord was cut and sent for gases. The placenta was delivered with gentle traction on the umbilical cord and uterine massage. The uterus was exteriorized and cleared of all clots and debris. Uterine tone was noted to be firm with 20U of pitocin given through the running IV and uterine massage.  The hysterotomy was closed with a running locked suture of 0 Vicryl.  The hysterotomy was then imbricated using an 0 Monocryl suture. The hysterotomy was noted to be hemostatic. The posterior cul-de-sac was and cleared of all clots and debris. The uterus was returned to the abdomen. The pericolic gutters were cleared of all clots and debris. The hysterotomy were reexamined and noted to be hemostatic. The peritoneum was closed with a running 3-0 Vicryl suture. The rectus muscles were reapproximated with interrupted sutures of 2-0 Vicryl. The fascia and rectus muscles were examined and areas of oozing were controlled with electrocautery. The fascia was closed with running 0 Vicryl suture. The subcutaneous tissue was irrigated and areas of oozing were controlled with electrocautery. The subcutaneous tissue was closed with a running suture of 3-0 Vicryl. The skin was closed with 4-0 Monocryl and covered with steri strips and a sterile island dressing.    All sponge, needle, and instrument counts were correct. The patient tolerated the procedure well, and was transferred to recovery in stable condition.     Nelly Jimenez MD  OB/GYN & Infertility  Pager [phone]  12/07/18

## 2018-12-07 NOTE — ANESTHESIA POSTPROCEDURE EVALUATION
Patient: Anabel Araiza    Procedure(s):   SECTION    Diagnosis:pregnancy  Diagnosis Additional Information: No value filed.    Anesthesia Type:  Epidural    Note:  Anesthesia Post Evaluation    Patient location during evaluation: PACU  Patient participation: Able to fully participate in evaluation  Level of consciousness: awake and alert  Pain management: satisfactory to patient  Airway patency: patent  Cardiovascular status: hemodynamically stable  Respiratory status: acceptable and unassisted  Hydration status: balanced  PONV: none     Anesthetic complications: None          Last vitals:  Vitals:    18 1500 18 1525 18 1600   BP: 126/80 138/86 140/80   Pulse:      Resp: 16 16 16   Temp:      SpO2:  100% 95%         Electronically Signed By: Silvestre Brambila MD  2018  4:40 PM

## 2018-12-07 NOTE — BRIEF OP NOTE
OB/GYN Brief Operative Note    Pre-operative diagnosis: , IUP at 40w4d  Category 2 FHT remote from delivery   Post-operative diagnosis: Same as above s/p delivery  Double nuchal cord   Procedure: Primary low transverse  section with two layer uterine closure via Pfannenstiel incision   Surgeon: Beatriz Jimenez MD   Assistant(s): OR staff   Anesthesia: Epidural anesthesia   Estimated blood loss: 800ml   Total IV fluids: (See anesthesia record)   Blood transfusion: No transfusion was given during surgery   Total urine output: (See anesthesia record)   Drains: Baird catheter   Specimens: Cord blood   Findings: Cephalic presentation, clear amniotic fluid. Double nuchal cord noted. OA position with significant caput. Vigorous female infant with Apgars 8 and 9 at 1 and 5 minutes, respectively. NICU present for delivery. Weight 6 lb 10 oz.   Complications: None   Condition: Stable, transferred to PACU   Comments: See accompanying operative report for full details     Nelly Jimenez MD  OB/GYN & Infertility  2018

## 2018-12-07 NOTE — PLAN OF CARE
Problem: Labor (Cervical Ripen, Induct, Augment) (Adult,Obstetrics,Pediatric)  Goal: Signs and Symptoms of Listed Potential Problems Will be Absent, Minimized or Managed (Labor)  Signs and symptoms of listed potential problems will be absent, minimized or managed by discharge/transition of care (reference Labor (Cervical Ripen, Induct, Augment) (Adult,Obstetrics,Pediatric) CPG).   Outcome: Therapy, progress toward functional goals as expected    0725-Taking over care of pt. Report received from SALLY Manning. Anabel is a 32yo  40w4d GBS neg here for induction of labor for gHTN. SVE at 0700 was 5cm. Pt comfortable with an epidural.  Rodger at bedside and supportive. Pt resting. Afebrile.  0735-Pt tearful. Frustrated with length of labor. Denies pain. Given lavender, warm blanket and emotional support.  0906-4.5 mins decel tala of 90 after position change. IV bolus initiated, pitocin turned off. O2 on at 15L.   0910-SVE 5-6cm/90%/-1.   0919-Dr Jimenez paged with update.  1000-Updated Dr Jimenez. OK to continue on pitocin. Notify if prolonged decel reoccurs or pitocin turned off.   1112-Completely dilated, +2 station. Attempted push x2 with one cxn, Fetal heart rate down to 85 x 4.5 mins.  1114-Pitocin off, O2 on, iv bolus initiated.  1115-Position change to right side.  1200-Pt transferred via bed to OB OR 1 with kaur and IV patent.  1227-Delivery of viable female infant with nuchal cord x2. Delayed cord clamping x1 min then transferred to [facility] for stabilization by Elham CASSIDY RN and NNP.   1320-Pt arrived via bed in OB PACU 1 with kaur and IV patent. Denies pain or nausea.   1505-PACU recovery uneventful. Pt tolerating ice chips and sips of water. Denies nausea or pain. Able to move legs bilaterally. IV and kaur patent    Data: Anabel Araiza transferred to [facility] via wheelchair at 1515. Baby transferred via parent's arms.  Action: Receiving unit notified of transfer: Yes. Patient and family notified of  room change. Report given to SALLY Saenz at bedside. Belongings sent to receiving unit. Accompanied by Registered Nurse. Oriented patient to surroundings. Call light within reach. ID bands double-checked with receiving RN.  Response: Patient tolerated transfer and is stable.

## 2018-12-07 NOTE — ANESTHESIA PREPROCEDURE EVALUATION
Anesthesia Evaluation     . Pt has had prior anesthetic.     No history of anesthetic complications          ROS/MED HX    ENT/Pulmonary:      (-) tobacco use and sleep apnea   Neurologic:      (-) seizures   Cardiovascular:        (-) hypertension   METS/Exercise Tolerance:     Hematologic:         Musculoskeletal:         GI/Hepatic:        (-) GERD and liver disease   Renal/Genitourinary:      (-) renal disease   Endo:     (+) thyroid problem .   (-) Type II DM   Psychiatric:         Infectious Disease:         Malignancy:         Other:                     Physical Exam  Normal systems: cardiovascular, pulmonary and dental    Airway   Mallampati: II  TM distance: >3 FB  Neck ROM: full    Dental     Cardiovascular       Pulmonary                     Anesthesia Plan      History & Physical Review  History and physical reviewed and following examination; no interval change.    ASA Status:  2 .  OB Epidural Asa: 2   NPO Status:  > 8 hours    Plan for Epidural (epidural in situ)   PONV prophylaxis:  Ondansetron (or other 5HT-3)       Postoperative Care  Postoperative pain management:  Multi-modal analgesia.      Consents  Anesthetic plan, risks, benefits and alternatives discussed with:  Patient and Spouse..        Procedure: Procedure(s):   SECTION  Preop diagnosis: pregnancy    Allergies   Allergen Reactions     Augmentin Hives     Past Medical History:   Diagnosis Date     Thyroid disease     hypo     Past Surgical History:   Procedure Laterality Date     APPENDECTOMY       ORTHOPEDIC SURGERY      acl surgery     wisdom teeth       Prior to Admission medications    Medication Sig Start Date End Date Taking? Authorizing Provider   Fish Oil-Cholecalciferol (FISH OIL + D3 PO)    Yes Reported, Patient   MAGNESIUM OXIDE PO Take 1,000 mg by mouth   Yes Reported, Patient   Prenatal MV-Min-Fe Fum-FA-DHA (PRENATAL 1 PO)    Yes Reported, Patient   VITAMIN D, CHOLECALCIFEROL, PO Take 500 Units by mouth  daily   Yes Reported, Patient   NO ACTIVE MEDICATIONS     Reported, Patient     Current Facility-Administered Medications Ordered in Epic   Medication Dose Route Frequency Last Rate Last Dose     acetaminophen (TYLENOL) tablet 650 mg  650 mg Oral Q4H PRN         azithromycin (ZITHROMAX) 500 mg in sodium chloride 0.9 % 250 mL intermittent infusion  500 mg Intravenous Pre-Op/Pre-procedure x 1 dose         carboprost (HEMABATE) injection 250 mcg  250 mcg Intramuscular Once PRN         ceFAZolin (ANCEF) 1 g vial to attach to  ml bag for ADULT or 50 ml bag for PEDS  1 g Intravenous See Admin Instructions         ceFAZolin (ANCEF) intermittent infusion 2 g in 100 mL dextrose PRE-MIX  2 g Intravenous Pre-Op/Pre-procedure x 1 dose         dinoprostone(CERVIDIL) suppository REMOVAL   Vaginal Once         ePHEDrine injection 5 mg  5 mg Intravenous Q3 Min PRN         fentaNYL (PF) (SUBLIMAZE) injection  mcg   mcg Intravenous Q1H PRN         fentaNYL (SUBLIMAZE) 2 mcg/mL, ropivacaine (NAROPIN) 0.2% in NaCl 0.9% EPIDURAL infusion   EPIDURAL Continuous 12 mL/hr at 12/07/18 0730 12 mL/hr at 12/07/18 0730     ibuprofen (ADVIL/MOTRIN) tablet 800 mg  800 mg Oral Once PRN         lactated ringers BOLUS 1,000 mL  1,000 mL Intravenous Once         lactated ringers infusion   Intravenous Continuous         lactated ringers infusion   Intravenous Continuous 999 mL/hr at 12/07/18 1114       lidocaine (LMX4) cream   Topical Q1H PRN         lidocaine (LMX4) cream   Topical Q1H PRN         lidocaine (LMX4) cream   Topical Q1H PRN         lidocaine 1 % 0.1-20 mL  0.1-20 mL Subcutaneous Once PRN         lidocaine 1 % 1 mL  1 mL Other Q1H PRN         lidocaine 1 % 1 mL  1 mL Other Q1H PRN         lidocaine 1 % 1 mL  1 mL Other Q1H PRN         medication instruction   Does not apply Continuous PRN         medication instruction   Does not apply Continuous PRN         Medication Instructions: misoprostol (CYTOTEC)- Nurse to  "discuss ordering with provider, if needed. Ordered via \"OB misoprostol (CYTOTEC) Postpartum Hemorrhage PANEL\"   Does not apply Continuous PRN         methylergonovine (METHERGINE) injection 200 mcg  200 mcg Intramuscular Once PRN         misoprostol (cervical ripening) (CYTOTEC) quarter-tab 50 mcg  50 mcg Oral Q4H DUTCH   50 mcg at 12/06/18 1403     nalbuphine (NUBAIN) injection 2.5-5 mg  2.5-5 mg Intravenous Q6H PRN         naloxone (NARCAN) injection 0.1-0.4 mg  0.1-0.4 mg Intravenous Q2 Min PRN         naloxone (NARCAN) injection 0.1-0.4 mg  0.1-0.4 mg Intravenous Q2 Min PRN         naloxone (NARCAN) injection 0.1-0.4 mg  0.1-0.4 mg Intravenous Q2 Min PRN         nitrous oxide/oxygen 50/50 blend   Inhalation Continuous PRN         ondansetron (ZOFRAN) injection 4 mg  4 mg Intravenous Q6H PRN         Opioid plan postpartum - medication instruction   Does not apply Continuous PRN         oxyCODONE-acetaminophen (PERCOCET) 5-325 MG per tablet 1 tablet  1 tablet Oral Once PRN         oxytocin (PITOCIN) 30 units in 500 mL 0.9% NaCl infusion  1-24 collin-units/min Intravenous Continuous   Stopped at 12/07/18 1114     oxytocin (PITOCIN) 30 units in 500 mL 0.9% NaCl infusion  100-340 mL/hr Intravenous Continuous PRN         oxytocin (PITOCIN) injection 10 Units  10 Units Intramuscular Once PRN         sodium chloride (PF) 0.9% PF flush 3 mL  3 mL Intravenous Q1H PRN         sodium chloride (PF) 0.9% PF flush 3 mL  3 mL Intravenous Q8H         sodium chloride (PF) 0.9% PF flush 3 mL  3 mL Intracatheter Q1H PRN         sodium chloride (PF) 0.9% PF flush 3 mL  3 mL Intracatheter Q8H         sodium chloride (PF) 0.9% PF flush 3 mL  3 mL Intracatheter Q1H PRN         sodium chloride (PF) 0.9% PF flush 3 mL  3 mL Intracatheter Q8H   3 mL at 12/06/18 1903     sodium citrate-citric acid (BICITRA) solution 30 mL  30 mL Oral Pre-Op/Pre-procedure x 1 dose         tranexamic acid (CYKLOKAPRON) Bolus 1g vial attach to NaCl 50 or 100 mL " bag ADULT  1 g Intravenous Q30 Min PRN         No current Epic-ordered outpatient prescriptions on file.     Wt Readings from Last 1 Encounters:   12/05/18 90.4 kg (199 lb 6.4 oz)     Temp Readings from Last 1 Encounters:   12/07/18 36.4  C (97.5  F) (Temporal)     BP Readings from Last 6 Encounters:   12/07/18 110/57   12/05/18 127/84   02/23/12 122/60     Pulse Readings from Last 4 Encounters:   12/07/18 66     Resp Readings from Last 1 Encounters:   12/07/18 16     SpO2 Readings from Last 1 Encounters:   12/07/18 97%     Recent Labs   Lab Test  12/07/18   0325  12/06/18   0830  12/05/18   1310   NA   --    --   137   POTASSIUM   --    --   4.0   CHLORIDE   --    --   108   CO2   --    --   21   ANIONGAP   --    --   8   GLC   --    --   71   BUN   --    --   14   CR  0.70  0.75  0.73   SPENSER   --    --   8.7     Recent Labs   Lab Test  12/07/18   0325  12/06/18   0830   WBC  15.3*  7.5   HGB  14.6  14.0   PLT  183  180     No results for input(s): INR in the last 32472 hours.    Invalid input(s): APTT   RECENT LABS:   ECG:   ECHO:   CXR:                      .

## 2018-12-07 NOTE — PLAN OF CARE
Pt requests epidural at 0315. IV fluid bolus started. Labs ordered and drawn. Consent signed. MDA in room at 0405. Pt placed sitting at bedside. Procedure discussed, questions answered. Time out preformed. Hand off of meds to MDA. Vitals monitored through out. Epidural placement complete at 0425. Pt placed in right lateral tilt. RN remains at bedside. Pt states relief of pain at 0435.

## 2018-12-07 NOTE — ANESTHESIA PROCEDURE NOTES
Peripheral nerve/Neuraxial procedure note : epidural catheter  Pre-Procedure  Performed by HERBERT CHAVIS  Location: OB      Pre-Anesthestic Checklist: patient identified, IV checked, risks and benefits discussed, informed consent, monitors and equipment checked, pre-op evaluation and at physician/surgeon's request    Timeout  Correct Patient: Yes   Correct Procedure: Yes   Correct Site: Yes   Correct Laterality: N/A   Correct Position: Yes   Site Marked: N/A   .   Procedure Documentation    .    Procedure:    Epidural catheter.  Insertion Site:L3-4  (midline approach) Injection technique: LORT saline   Local skin infiltrated with 3 mL of 1% lidocaine.  HALEIGH at 5 cm     Patient Prep;mask, sterile gloves, povidone-iodine 7.5% surgical scrub, patient draped.  .  Needle: ToFear [place] needle Needle Gauge: 17.    Needle Length (Inches) 3.5  # of attempts: 1 and # of redirects: : 0. .   Catheter: 19 G . .  Catheter threaded easily  4 cm epidural space.  9 cm at skin.   .    Assessment/Narrative  Paresthesias: No.  .  .  Aspiration negative for heme or CSF  . Test dose of 3 mL lidocaine 1.5% w/ 1:200,000 epinephrine at. Test dose negative for signs of intravascular, subdural or intrathecal injection. Comments:  Pt tolerated well. No complications.   Catheter taped sterile and securely with sterile medical adhesive spray and tegaderm.   Pt placed back in supine with MARV.   FHTs stable post-procedure.

## 2018-12-07 NOTE — PROGRESS NOTES
Labor Progress Note    S: Patient comfortable with epidural.    O:   Patient Vitals for the past 4 hrs:   BP Temp Temp src SpO2   18 0600 126/75 - - 99 %   18 0530 94/55 - - 99 %   18 0430 - 97.8  F (36.6  C) Oral -     SVE: 5/80/-1    FHT: Baseline 140, moderate variability, no accelerations, no decelerations  [place]: 3-4 contractions in 10 minutes    A/P: Anabel Araiza is a 33 year old  at 40w4d admitted for medical induction of labor due to gestational hypertension.    IOL: - S/p cervidil and cytotec, AROM yesterday at 1640 with clear fluid. Pitocin turned on and off overnight due to fetal intolerance, but restarted this morning at 6:15. Will try to increase per protocol.   FWB: - Now Category 1 FHT, continue EFM  PNC: - Rh neg, Rubella immune, GBS neg  Pain management: Comfortable with epidural    Nelly Jimenez MD  OB/GYN & Infertility  2018, 7:11 AM

## 2018-12-08 LAB
ALT SERPL W P-5'-P-CCNC: 22 U/L (ref 0–50)
AST SERPL W P-5'-P-CCNC: 30 U/L (ref 0–45)
CREAT SERPL-MCNC: 0.83 MG/DL (ref 0.52–1.04)
ERYTHROCYTE [DISTWIDTH] IN BLOOD BY AUTOMATED COUNT: 13.1 % (ref 10–15)
GFR SERPL CREATININE-BSD FRML MDRD: 80 ML/MIN/1.7M2
HCT VFR BLD AUTO: 34.6 % (ref 35–47)
HGB BLD-MCNC: 12 G/DL (ref 11.7–15.7)
MCH RBC QN AUTO: 33.6 PG (ref 26.5–33)
MCHC RBC AUTO-ENTMCNC: 34.7 G/DL (ref 31.5–36.5)
MCV RBC AUTO: 97 FL (ref 78–100)
PLATELET # BLD AUTO: 151 10E9/L (ref 150–450)
RBC # BLD AUTO: 3.57 10E12/L (ref 3.8–5.2)
WBC # BLD AUTO: 13.4 10E9/L (ref 4–11)

## 2018-12-08 PROCEDURE — 84450 TRANSFERASE (AST) (SGOT): CPT | Performed by: OBSTETRICS & GYNECOLOGY

## 2018-12-08 PROCEDURE — 85027 COMPLETE CBC AUTOMATED: CPT | Performed by: OBSTETRICS & GYNECOLOGY

## 2018-12-08 PROCEDURE — 12000037 ZZH R&B POSTPARTUM INTERMEDIATE

## 2018-12-08 PROCEDURE — 82565 ASSAY OF CREATININE: CPT | Performed by: OBSTETRICS & GYNECOLOGY

## 2018-12-08 PROCEDURE — 25000128 H RX IP 250 OP 636: Performed by: OBSTETRICS & GYNECOLOGY

## 2018-12-08 PROCEDURE — 84460 ALANINE AMINO (ALT) (SGPT): CPT | Performed by: OBSTETRICS & GYNECOLOGY

## 2018-12-08 PROCEDURE — 25000132 ZZH RX MED GY IP 250 OP 250 PS 637: Performed by: OBSTETRICS & GYNECOLOGY

## 2018-12-08 PROCEDURE — 36415 COLL VENOUS BLD VENIPUNCTURE: CPT | Performed by: OBSTETRICS & GYNECOLOGY

## 2018-12-08 RX ADMIN — ACETAMINOPHEN 975 MG: 325 TABLET, FILM COATED ORAL at 16:33

## 2018-12-08 RX ADMIN — IBUPROFEN 800 MG: 400 TABLET ORAL at 14:12

## 2018-12-08 RX ADMIN — ACETAMINOPHEN 975 MG: 325 TABLET, FILM COATED ORAL at 00:43

## 2018-12-08 RX ADMIN — ACETAMINOPHEN 975 MG: 325 TABLET, FILM COATED ORAL at 08:04

## 2018-12-08 RX ADMIN — IBUPROFEN 800 MG: 400 TABLET ORAL at 19:54

## 2018-12-08 RX ADMIN — KETOROLAC TROMETHAMINE 30 MG: 30 INJECTION, SOLUTION INTRAMUSCULAR at 01:52

## 2018-12-08 RX ADMIN — SENNOSIDES AND DOCUSATE SODIUM 1 TABLET: 8.6; 5 TABLET ORAL at 08:05

## 2018-12-08 RX ADMIN — KETOROLAC TROMETHAMINE 30 MG: 30 INJECTION, SOLUTION INTRAMUSCULAR at 08:05

## 2018-12-08 RX ADMIN — SENNOSIDES AND DOCUSATE SODIUM 1 TABLET: 8.6; 5 TABLET ORAL at 19:54

## 2018-12-08 NOTE — PROGRESS NOTES
"POD 1  S.  Doing well.  Brie still in  O.  AVSS  /76  Pulse 66  Temp 98.1  F (36.7  C) (Oral)  Resp 16  Ht 1.6 m (5' 3\")  Wt 90.4 kg (199 lb 6.4 oz)  SpO2 100%  Breastfeeding? Unknown  BMI 35.32 kg/m2    Intake/Output Summary (Last 24 hours) at 12/08/18 0958  Last data filed at 12/08/18 0000   Gross per 24 hour   Intake             1250 ml   Output             3850 ml   Net            -2600 ml   Abd soft  Bandage dry    Recent Labs  Lab 12/07/18  0325 12/06/18  0830 12/05/18  1310   HGB 14.6 14.0 14.5   A.  Appropriate recovery  P.  Routine care, check hgb/LBakerMD  "

## 2018-12-08 NOTE — LACTATION NOTE
Initial Lactation visit. Recommend unlimited, frequent breast feedings: at least 8 - 12 times every 24 hours.  Avoid pacifiers and supplementation with formula unless medically indicated.  Explained benefits of holding baby skin on skin to help promote better breastfeeding outcomes. Observed good latch and rhythmic suckle. Reviewed positioning, deep vs shallow latch, and hand expression. Will continue to follow as needed. N Day RN IBCLC

## 2018-12-08 NOTE — PLAN OF CARE
Problem: Postpartum ( Delivery) (Adult,Obstetrics,Pediatric)  Goal: Signs and Symptoms of Listed Potential Problems Will be Absent, Minimized or Managed (Postpartum)  Signs and symptoms of listed potential problems will be absent, minimized or managed by discharge/transition of care (reference Postpartum ( Delivery) (Adult,Obstetrics,Pediatric) CPG).   Outcome: No Change  VSS. Pain controlled well with Toradol and Tylenol. Breastfeeding improving with latch assist. Ambulated well to bathroom, zeenat-care done. Saline locked. Questions answered. Will continue to monitor.

## 2018-12-08 NOTE — PLAN OF CARE
Problem: Postpartum ( Delivery) (Adult,Obstetrics,Pediatric)  Goal: Signs and Symptoms of Listed Potential Problems Will be Absent, Minimized or Managed (Postpartum)  Signs and symptoms of listed potential problems will be absent, minimized or managed by discharge/transition of care (reference Postpartum ( Delivery) (Adult,Obstetrics,Pediatric) CPG).   Outcome: Improving  VSS.  Incision C/D/I.  Up to bathroom with assist, tolerated well.  Pain well controlled, requesting prn pain meds as needed.  Working on breastfeeding  and  cares.  On pathway. Continue to monitor and notify MD as needed.

## 2018-12-08 NOTE — ANESTHESIA POSTPROCEDURE EVALUATION
Patient: Anabel Araiza    * No procedures listed *    Diagnosis:* No pre-op diagnosis entered *  Diagnosis Additional Information: No value filed.    Anesthesia Type:  No value filed.    Note:  Anesthesia Post Evaluation    Patient location during evaluation: bedside  Patient participation: Able to fully participate in evaluation  Level of consciousness: awake  Pain management: adequate  Airway patency: patent  Cardiovascular status: acceptable  Respiratory status: acceptable  Hydration status: acceptable  PONV: none     Anesthetic complications: None    Comments: Patient denies any residual numbness or weakness in bilateral lower extremities, severe back pain, or positional headache. Patient was instructed to inform nurse of any of these symptoms and to contact anesthesia to reevaluate.        Last vitals:  Vitals:    12/08/18 0900 12/08/18 1412 12/08/18 1600   BP:   131/79   Pulse:      Resp: 16 16 16   Temp:   36.7  C (98  F)   SpO2:            Electronically Signed By: Enzo Jorge MD  December 8, 2018  4:54 PM

## 2018-12-09 VITALS
DIASTOLIC BLOOD PRESSURE: 82 MMHG | OXYGEN SATURATION: 100 % | TEMPERATURE: 98.6 F | RESPIRATION RATE: 16 BRPM | BODY MASS INDEX: 35.33 KG/M2 | SYSTOLIC BLOOD PRESSURE: 132 MMHG | HEIGHT: 63 IN | WEIGHT: 199.4 LBS | HEART RATE: 66 BPM

## 2018-12-09 PROCEDURE — 25000132 ZZH RX MED GY IP 250 OP 250 PS 637: Performed by: OBSTETRICS & GYNECOLOGY

## 2018-12-09 RX ADMIN — IBUPROFEN 800 MG: 400 TABLET ORAL at 08:05

## 2018-12-09 RX ADMIN — ACETAMINOPHEN 975 MG: 325 TABLET, FILM COATED ORAL at 02:20

## 2018-12-09 RX ADMIN — IBUPROFEN 800 MG: 400 TABLET ORAL at 02:20

## 2018-12-09 RX ADMIN — SENNOSIDES AND DOCUSATE SODIUM 2 TABLET: 8.6; 5 TABLET ORAL at 08:05

## 2018-12-09 RX ADMIN — ACETAMINOPHEN 975 MG: 325 TABLET, FILM COATED ORAL at 10:26

## 2018-12-09 NOTE — DOWNTIME EVENT NOTE
The EMR was down for 6 hours on 12/9/2018.    Chichi Boone was responsible for completing the paper charting during this time period.     The following information was re-entered into the system by Kylee Kilpatrick: MAR    The following information will remain in the paper chart: all vital signs, assessments and nursing notes entered by Chichi Boone.     Kylee Kilpatrick  12/9/2018

## 2018-12-09 NOTE — PLAN OF CARE
D: VSS, assessments WDL.   I: Pt. received complete discharge paperwork and no home medications as filled by discharge pharmacy.  Pt. was given times of last dose for all discharge medications in writing on discharge medication sheets.  Discharge teaching included home medication, pain management, activity restrictions, postpartum cares, and signs and symptoms of infection.    A: Discharge outcomes on care plan met.  Mother states understanding and comfort with self and baby cares.  P: Pt. discharged to home.  Pt. was discharged with baby, and bands were checked at time of discharge.  Pt. was accompanied by , nurse and baby, and left with personal belongings.  Home care ordered.  Pt. to follow up with OB per MD order.  Pt. had no further questions at the time of discharge and no unmet needs were identified.

## 2018-12-09 NOTE — PLAN OF CARE
Problem: Patient Care Overview  Goal: Plan of Care/Patient Progress Review  Outcome: Improving  VSS.  Incision C/D/I.  Up ad terry, ambulated in halls x2, tolerated well.  Pain well controlled, requesting prn pain meds as needed.  Independent with breastfeeding  and  cares.  On pathway. Continue to monitor and notify MD as needed.

## 2018-12-09 NOTE — PROGRESS NOTES
"[facility] Obstetrics Post-Op Progress Note  2018    S: Doing well.  Pain is well controlled. Bleeding Light. Infant is being .  Ambulatory.  Tolerating regular diet. Voiding spontaneously. Passing gas, no BM yet.    O:  /82   Pulse 66   Temp 98.6  F (37  C) (Oral)   Resp 16   Ht 1.6 m (5' 3\")   Wt 90.4 kg (199 lb 6.4 oz)   SpO2 100%   Breastfeeding? Unknown   BMI 35.32 kg/m     Gen: healthy, alert and no distress   Resp: Nonlabored breathing  Abd: soft, non-distended, appropriately TTP, FF at u  Incision: C/D/I, staples in place   Ext: non-tender, no edema    Hemoglobin   Date Value Ref Range Status   2018 12.0 11.7 - 15.7 g/dL Final   2018 14.6 11.7 - 15.7 g/dL Final     Lab Results   Component Value Date    ABO O 2018    RH Neg 2018    AS neg 2018       A: 33 year old  POD#2 s/p P LTCS for category 2 FHR  P:   Routine post-operative care  Ambulation encouraged  Breast feeding strategies discussed  Reportable signs and symptoms dicussed with the patient  RhoGam ordered and to be given if baby Rh pos  Staples to be removed and Steri-Strips placed today  Anticipate discharge today and declines rx's    Elham Alexandre., DO   Obstetrics, Gynecology, and Infertility      "

## 2018-12-09 NOTE — PLAN OF CARE
The EMR was down for 6 hours on 12/9/2018.    Chichi Boone RN was responsible for completing the paper charting during this time period.     No information was re-entered into the system by Chichi Boone.    Chichi Boone  12/9/2018

## 2018-12-09 NOTE — LACTATION NOTE
Follow up visit.  Infant has been feeding well.  R nipple has a bruise across it.  Infant was latched to L at time of visit, latch was very tight.  Worked with Anabel on getting infant to latch more deeply.  She did well latching her in football position on the R.  Anabel said it was much more comfortable.  Reviewed signs infant is getting enough and process of milk coming in.  Encouraged Anabel to follow up with outpatient lactation this week and reviewed resources.    Jia Lisa  RN, IBCLC

## 2018-12-09 NOTE — PLAN OF CARE
VSS. Pain controlled well with Ibuprofen and Tylenol. ABD binder for support. Breastfeeding improving with latch assist. Questions answered. Will continue to monitor.

## 2018-12-10 NOTE — DISCHARGE SUMMARY
DELIVERY DISCHARGE SUMMARY    Admit date: 2018  Discharge date: 2018    Admit Dx:   - 33 year old  at 40w2d   - GBS negative status  - Rh negative status  - Gestational HTN    Discharge Dx:  - , s/p procedure as below, delivered at 40w4d   - GBS negative status  - Rh negative status, Rhogam not indicated as baby Rh negative    Procedures:  - Fetal and uterine monitoring   - Epidural analgesia  - PLTCS via Pfannenstiel Incision with double layer uterine closure  - Serial laboratory monitoring     Admit HPI:  Anabel Araiza is a 33 year old  at 40w2d admitted for IOL for gestational hypertension. She had normal LFTs, Cr and platelets and no proteinuria. Her BPs remained mild range and did not need to be treated with antihypertensives. Please see her admit H&P for full details of her PMH, PSH, Meds, Allergies and exam on admit.    Hospital course:  Anabel Araiza was admitted to the hospital on 2018 for the above listed indications.     Cervical ripening was started with cervidil and followed by oral cytotec. She underwent AROM at 1640 on 18 and was started on pitocin. She progressed to 4 cm dilated and received an epidural for anesthesia. The fetal heart tracing showed some minimal variability and intermittent decelerations requiring the pitocin to be turned off and on again multiple times. She progressed to completely dilated and tried a practice push. The FHT was significant for a 4 minute prolonged deceleration with return to baseline of 150 with minimal variability. She was at +2 station but had significant caput of fetal head. The pitocin was turned off immediately. At that time the discussion was had with the patient whether to continue attempting to push vs proceeding with a  section given concern for fetal tracing.   The risks, benefits, and alternatives of  section were discussed with the patient and the patient desired to proceed with a primary   section. Written consent was obtained prior to the procedure.     On 18 at 12:27 pm, she delivered a viable female infant in the cephalic position. APGARS were 8 and 9. Weight was 3005 gms. EBL from the delivery was 800cc secondary to uterine atony. She was given 20 units of pitocin with good subsequent uterine tone. Please see her  Section Operative Note for full details regarding her delivery.    Her postoperative course was uncomplicated. Hgb was at 12.0 on day of discharge.     On POD#2, she was meeting all of her postpartum goals and deemed stable for discharge. She was voiding without difficulty, tolerating a regular diet without nausea and vomiting, her pain was well controlled on oral pain medicines and her lochia was appropriate. She was breastfeeding her infant without difficulty. She did not require Rhogam as baby is Rh negative also.    Discharge/Disposition:  Anabel Araiza was discharged to home in stable condition with the following instructions/medications:  1) Call for temperature > 100.4, foul smelling vaginal discharge, bleeding > 1 pad per hour x 2 hrs, pain not controlled by oral pain meds, severe constipation or severe nausea or vomiting.  2) She was instructed to follow-up with her primary OB in 2 weeks for a routine postpartum visit.   3) She was instructed to continue her PNV on discharge if she wished to breast feed her infant.  4) She was discharged home with the following medications:     Anabel Araiza   Home Medication Instructions ÓSCAR:[id number]    Printed on:12/10/18 0853   Medication Information                      Fish Oil-Cholecalciferol (FISH OIL + D3 PO)               MAGNESIUM OXIDE PO  Take 1,000 mg by mouth             NO ACTIVE MEDICATIONS               Prenatal MV-Min-Fe Fum-FA-DHA (PRENATAL 1 PO)               VITAMIN D, CHOLECALCIFEROL, PO  Take 500 Units by mouth daily               Nelly Jimenez MD  OB/GYN & Infertility  Pager [phone]  12/10/18

## 2022-02-21 ENCOUNTER — TRANSFERRED RECORDS (OUTPATIENT)
Dept: HEALTH INFORMATION MANAGEMENT | Facility: CLINIC | Age: 37
End: 2022-02-21

## 2022-02-25 ENCOUNTER — TRANSFERRED RECORDS (OUTPATIENT)
Dept: HEALTH INFORMATION MANAGEMENT | Facility: CLINIC | Age: 37
End: 2022-02-25

## 2022-08-08 ENCOUNTER — TRANSFERRED RECORDS (OUTPATIENT)
Dept: HEALTH INFORMATION MANAGEMENT | Facility: CLINIC | Age: 37
End: 2022-08-08

## 2022-08-22 ENCOUNTER — TRANSFERRED RECORDS (OUTPATIENT)
Dept: HEALTH INFORMATION MANAGEMENT | Facility: CLINIC | Age: 37
End: 2022-08-22

## 2022-08-22 ENCOUNTER — MEDICAL CORRESPONDENCE (OUTPATIENT)
Dept: HEALTH INFORMATION MANAGEMENT | Facility: CLINIC | Age: 37
End: 2022-08-22

## 2022-08-26 ENCOUNTER — OFFICE VISIT (OUTPATIENT)
Dept: SURGERY | Facility: CLINIC | Age: 37
End: 2022-08-26
Payer: COMMERCIAL

## 2022-08-26 ENCOUNTER — PATIENT OUTREACH (OUTPATIENT)
Dept: ONCOLOGY | Facility: CLINIC | Age: 37
End: 2022-08-26

## 2022-08-26 VITALS
RESPIRATION RATE: 16 BRPM | HEIGHT: 63 IN | SYSTOLIC BLOOD PRESSURE: 134 MMHG | OXYGEN SATURATION: 100 % | BODY MASS INDEX: 30.12 KG/M2 | WEIGHT: 170 LBS | DIASTOLIC BLOOD PRESSURE: 80 MMHG | HEART RATE: 84 BPM

## 2022-08-26 DIAGNOSIS — R92.8 ABNORMALITY OF RIGHT BREAST ON SCREENING MAMMOGRAM: Primary | ICD-10-CM

## 2022-08-26 PROCEDURE — 99203 OFFICE O/P NEW LOW 30 MIN: CPT | Performed by: SURGERY

## 2022-08-26 RX ORDER — MULTIVITAMIN
1 TABLET ORAL DAILY
COMMUNITY

## 2022-08-26 NOTE — PROGRESS NOTES
[facility] Breast Surgery Consultation    HPI:   Anabel Araiza is a 36 year old female who is seen in consultation at the request of Dr. Emy Ramirez for evaluation of a breast lump.     She had a screening mammogram in 2022 which revealed a 1cm asymmetry in the upper outer right breast at 2:00, posterior depth. Diagnostic imaging with US was done and this was felt to be benign tissue. 6 month follow up was recommended. She had a mammogram on 2022 which reveaeled a stable appearing 1cm asymmetry in the upper outer right breast at 2:00 and another 6 month follow up was recommended. Anabel was very concerned about the imaging and felt uncomfortable with the recommendations and is here to discuss her options.      She reports no breast concerns prior to her mammogram. No pain. No nipple discharge.      Hormonal history:   menarche 12,  1 children, 1st at age 33, pre menopausal, no OCP use, no HRT, no fertility treatment.     Family history of breast cancer: Yes - paternal grandmother diagnosed at 52,  at 56  Family history of ovarian cancer:  No  Family history of colon cancer: No  Family history of prostate cancer: No      Past Medical History:   has a past medical history of Thyroid disease.      Current Outpatient Medications:      Fish Oil-Cholecalciferol (FISH OIL + D3 PO), , Disp: , Rfl:      MAGNESIUM OXIDE PO, Take 1,000 mg by mouth, Disp: , Rfl:      NO ACTIVE MEDICATIONS, , Disp: , Rfl:      Prenatal MV-Min-Fe Fum-FA-DHA (PRENATAL 1 PO), , Disp: , Rfl:      VITAMIN D, CHOLECALCIFEROL, PO, Take 500 Units by mouth daily, Disp: , Rfl:     Past Surgical History:  Past Surgical History:   Procedure Laterality Date     APPENDECTOMY        SECTION N/A 2018    Procedure:  SECTION;  Surgeon: Beatriz Jimenez MD;  Location:  L+D     ORTHOPEDIC SURGERY      acl surgery     wisdom teeth       ZZC APPENDECTOMY      Description: Appendectomy;   "Recorded: 01/21/2013;           Allergies   Allergen Reactions     Augmentin Hives         Social History:  Social History     Socioeconomic History     Marital status:      Spouse name: Not on file     Number of children: Not on file     Years of education: Not on file     Highest education level: Not on file   Occupational History     Not on file   Tobacco Use     Smoking status: Never Smoker     Smokeless tobacco: Never Used   Substance and Sexual Activity     Alcohol use: No     Drug use: No     Sexual activity: Yes     Partners: Male   Other Topics Concern     Parent/sibling w/ CABG, MI or angioplasty before 65F 55M? Not Asked   Social History Narrative     Not on file     Social Determinants of Health     Financial Resource Strain: Not on file   Food Insecurity: Not on file   Transportation Needs: Not on file   Physical Activity: Not on file   Stress: Not on file   Social Connections: Not on file   Intimate Partner Violence: Not on file   Housing Stability: Not on file        ROS:  The 10 point review of systems is negative other than noted in the HPI and above.    PE:  Vitals: /80   Pulse 84   Resp 16   Ht 1.6 m (5' 3\")   Wt 77.1 kg (170 lb)   SpO2 100%   Breastfeeding No   BMI 30.11 kg/m    General appearance: well-nourished, sitting comfortably, no apparent distress  Psych: normal affect, pleasant  HEENT:  Head normocephalic and atraumatic, pupils equal and round, conjunctivae clear, mucous membranes moist, external ears and nose normal  Neck: Supple without thyromegaly or masses  Lungs: Respirations unlabored  Lymphatic: No cervical, or supraclavicular lymphadenopathy  Extremities: Without edema  Musculoskeletal:  Normal station and gait  Neurologic: nonfocal, grossly intact times four extremities, alert and oriented times three  Psychiatric: Mood and affect are appropriate  Skin: Without lesions or rashes    Breast:  A bilateral breast exam was performed in the supine position.. " Bilateral breasts were palpated in a circumferential clockwise fashion including the supraclavicular and axillary areas.   Breasts are symmetric. Nipples everted bilaterally. Skin is normal. There is very dense tissue centrally on each breast. There is increased density on the left upper outer breast as well as the right upper inner breast but no discrete mass palpable at either location.       Lymph:       No supraclavicular/infraclavicular adenopathy.   Axillary adenopathy: none    Assessment/Plan: Anabel Araiza is a 36 year old who presents with mammogram findings of a 1cm area of increased density in the right upper inner breast. Her lifetime risk of breast cancer as calculated today is 23.1% using the MISHEL score calculator. Given her increased lifetime risk and persistence of this area on now two mammograms and on my review is a bit more prominent on the August mamogram, we discussed the option of a breast MRI for further evaluation. We discussed the high sensitivity for MR and lower specificity. We discussed that if this area does enhance on MR a biopsy would be recommended likely using MR guidance as US did not reveal a mass. We did discuss the option of a follow up 6 month mammogram as well. She is most comfortable proceeding with a breast MRI which is what I would recommend. We also discussed genetic testing and a referral was placed for genetic counseling. They will call her to schedule. I will call her with her MRI results when available.       30 minutes total time spent on the date of this encounter doing: chart review, review of test results, patient visit, physical exam, education, counseling, developing plan of care, and documenting.    Susan Jones MD      Please route or send letter to:  Primary Care Provider (PCP) and Referring Provider

## 2022-08-26 NOTE — NURSING NOTE
Breast Patients    BREAST PATIENTS (ALL)    1-Do you have any of the following symptoms? Other: None  2-In which breast are you having the symptoms? right  3-Have you had a Mammogram? Other Location:  N/A    -  Date:  6/2022  4-Have you ever had a breast cyst drained? No  5-Have you ever had a breast biopsy? No  6-Have you ever had a Breast Cancer? No   7-Is there a history of Breast Cancer in your family? Yes   Relationship to you:    Great Grandmother  8-Have you ever had Ovarian Cancer? No  9-Is there a history of Ovarian Cancer in your family? No  10-Summarize your caffeine intake (i.e. coffee, tea, chocolate, soda etc.): coffee, tea    BREAST PATIENTS (FEMALE)    11-What age did your periods begin? 12  12-Date your last menstrual period began? 8/15/22  13-Number of full-term pregnancies: 1  14-Your age when your first child was born? 33  15-Did you nurse your children? Yes  16-Are you pregnant now? No  17-Have you begun menopause? No  18-Have you had either ovary removed?No  19-Do you have breast implants? No   20-Do you use hormone replacement therapy?  No  21-Have you taken oral contraceptive pills?  No  22-Have you had an intrauterine device (IUD) placed?  No  23-What is your current bra size?  36A

## 2022-10-04 ENCOUNTER — MEDICAL CORRESPONDENCE (OUTPATIENT)
Dept: HEALTH INFORMATION MANAGEMENT | Facility: CLINIC | Age: 37
End: 2022-10-04

## 2022-10-24 ENCOUNTER — TELEPHONE (OUTPATIENT)
Dept: SURGERY | Facility: CLINIC | Age: 37
End: 2022-10-24

## 2022-10-24 NOTE — TELEPHONE ENCOUNTER
Patient calling to request an order for a breast MRI be faxed to [facility].   Per patient they have faxed a request to our office 3 time and have not received anything back.     Please fax to : [phone]  Call patient to let her know the fax has been sent!: [phone]  OK to leave VM

## 2022-10-24 NOTE — TELEPHONE ENCOUNTER
Called patient and informed her that MRI has been faxed.    She will call if there are any further issues    Ruma Gee RN-BSN

## 2022-11-07 ENCOUNTER — MYC MEDICAL ADVICE (OUTPATIENT)
Dept: SURGERY | Facility: CLINIC | Age: 37
End: 2022-11-07

## 2022-11-09 ENCOUNTER — TELEPHONE (OUTPATIENT)
Dept: SURGERY | Facility: CLINIC | Age: 37
End: 2022-11-09

## 2022-11-09 NOTE — TELEPHONE ENCOUNTER
Patient returning a call from . she would like to speak with her regarding her MR results.    [phone]  OK to leave VM

## 2022-11-10 NOTE — CONFIDENTIAL NOTE
I tried calling patient again yesterday and today with no answer. I would recommend we set up a scheduled telephone visit on my next available appt to discuss her MRI results and plan going forward. This could be tomorrow at 11am if this works for her.     Susan Jones MD  [facility]  [phone]

## 2022-11-20 ENCOUNTER — HEALTH MAINTENANCE LETTER (OUTPATIENT)
Age: 37
End: 2022-11-20

## 2023-11-25 ENCOUNTER — HEALTH MAINTENANCE LETTER (OUTPATIENT)
Age: 38
End: 2023-11-25

## 2025-01-04 ENCOUNTER — HEALTH MAINTENANCE LETTER (OUTPATIENT)
Age: 40
End: 2025-01-04

## (undated) DEVICE — LINEN TOWEL PACK X5 5464

## (undated) DEVICE — SPONGE LAP 18X18" X8435

## (undated) DEVICE — PACK C-SECTION LF PL15OTA83B

## (undated) DEVICE — PAD CHUX UNDERPAD 23X24" 7136

## (undated) DEVICE — SOL NACL 0.9% IRRIG 1000ML BOTTLE 07138-09

## (undated) DEVICE — SU VICRYL 2-0 CT-1 27" J339H

## (undated) DEVICE — PACK SET-UP STD 9102

## (undated) DEVICE — STPL SKIN PROXIMATE 35 WIDE PMW35

## (undated) DEVICE — ESU GROUND PAD UNIVERSAL W/O CORD

## (undated) DEVICE — PREP CHLORAPREP 26ML TINTED ORANGE  260815

## (undated) DEVICE — DRAPE SHEET REV FOLD 3/4 9349

## (undated) DEVICE — DRSG KERLIX FLUFFS X5

## (undated) DEVICE — SOL WATER IRRIG 1000ML BOTTLE 07139-09

## (undated) DEVICE — BLADE CLIPPER 4406

## (undated) DEVICE — LINEN BABY BLANKET 5434

## (undated) DEVICE — LIGHT HANDLE X2

## (undated) DEVICE — SUCTION CANISTER MEDIVAC LINER 3000ML W/LID 65651-530